# Patient Record
Sex: FEMALE | Race: WHITE | NOT HISPANIC OR LATINO | URBAN - METROPOLITAN AREA
[De-identification: names, ages, dates, MRNs, and addresses within clinical notes are randomized per-mention and may not be internally consistent; named-entity substitution may affect disease eponyms.]

---

## 2017-08-09 ENCOUNTER — CONVERSION ENCOUNTER (OUTPATIENT)
Dept: OTHER | Facility: HOSPITAL | Age: 42
End: 2017-08-09

## 2018-03-28 ENCOUNTER — OFFICE VISIT CONVERTED (OUTPATIENT)
Dept: FAMILY MEDICINE CLINIC | Age: 43
End: 2018-03-28
Attending: FAMILY MEDICINE

## 2018-08-30 ENCOUNTER — CONVERSION ENCOUNTER (OUTPATIENT)
Dept: OTHER | Facility: HOSPITAL | Age: 43
End: 2018-08-30

## 2018-10-31 ENCOUNTER — OFFICE VISIT CONVERTED (OUTPATIENT)
Dept: FAMILY MEDICINE CLINIC | Age: 43
End: 2018-10-31
Attending: FAMILY MEDICINE

## 2018-11-09 ENCOUNTER — OFFICE VISIT CONVERTED (OUTPATIENT)
Dept: FAMILY MEDICINE CLINIC | Age: 43
End: 2018-11-09
Attending: NURSE PRACTITIONER

## 2019-02-20 ENCOUNTER — OFFICE VISIT CONVERTED (OUTPATIENT)
Dept: FAMILY MEDICINE CLINIC | Age: 44
End: 2019-02-20
Attending: NURSE PRACTITIONER

## 2019-02-27 ENCOUNTER — HOSPITAL ENCOUNTER (OUTPATIENT)
Dept: OTHER | Facility: HOSPITAL | Age: 44
Discharge: HOME | End: 2019-02-27
Attending: NURSE PRACTITIONER

## 2019-02-27 ENCOUNTER — OFFICE VISIT CONVERTED (OUTPATIENT)
Dept: FAMILY MEDICINE CLINIC | Age: 44
End: 2019-02-27
Attending: NURSE PRACTITIONER

## 2019-02-27 LAB
ALBUMIN SERPL-MCNC: 4.3 G/DL (ref 3.5–5)
ALBUMIN/GLOB SERPL: 1.1 {RATIO} (ref 1.4–2.6)
ALP SERPL-CCNC: 53 U/L (ref 42–98)
ALT SERPL-CCNC: 11 U/L (ref 10–40)
ANION GAP SERPL CALC-SCNC: 17 MMOL/L (ref 8–19)
AST SERPL-CCNC: 20 U/L (ref 15–50)
BASOPHILS # BLD MANUAL: 0.01 10*3/UL (ref 0–0.2)
BASOPHILS NFR BLD MANUAL: 0.2 % (ref 0–3)
BILIRUB SERPL-MCNC: 0.54 MG/DL (ref 0.2–1.3)
BUN SERPL-MCNC: 9 MG/DL (ref 5–25)
BUN/CREAT SERPL: 12 {RATIO} (ref 6–20)
CALCIUM SERPL-MCNC: 9.6 MG/DL (ref 8.7–10.4)
CHLORIDE SERPL-SCNC: 101 MMOL/L (ref 99–111)
CONV CO2: 25 MMOL/L (ref 22–32)
CONV TOTAL PROTEIN: 8.1 G/DL (ref 6.3–8.2)
CREAT UR-MCNC: 0.75 MG/DL (ref 0.5–0.9)
D DIMER PPP FEU-MCNC: 0.4 MG/L (ref 0–0.59)
DEPRECATED RDW RBC AUTO: 44.7 FL
EOSINOPHIL # BLD MANUAL: 0.05 10*3/UL (ref 0–0.7)
EOSINOPHIL NFR BLD MANUAL: 0.9 % (ref 0–7)
ERYTHROCYTE [DISTWIDTH] IN BLOOD BY AUTOMATED COUNT: 13.2 % (ref 11.5–14.5)
GFR SERPLBLD BASED ON 1.73 SQ M-ARVRAT: >60 ML/MIN/{1.73_M2}
GLOBULIN UR ELPH-MCNC: 3.8 G/DL (ref 2–3.5)
GLUCOSE SERPL-MCNC: 84 MG/DL (ref 65–99)
GRANS (ABSOLUTE): 3.13 10*3/UL (ref 2–8)
GRANS: 58.6 % (ref 30–85)
HBA1C MFR BLD: 14.9 G/DL (ref 12–16)
HCT VFR BLD AUTO: 44.6 % (ref 37–47)
IMM GRANULOCYTES # BLD: 0.01 10*3/UL (ref 0–0.54)
IMM GRANULOCYTES NFR BLD: 0.2 % (ref 0–0.43)
LYMPHOCYTES # BLD MANUAL: 1.8 10*3/UL (ref 1–5)
LYMPHOCYTES NFR BLD MANUAL: 6.5 % (ref 3–10)
MCH RBC QN AUTO: 30.2 PG (ref 27–31)
MCHC RBC AUTO-ENTMCNC: 33.4 G/DL (ref 33–37)
MCV RBC AUTO: 90.3 FL (ref 81–99)
MONOCYTES # BLD AUTO: 0.35 10*3/UL (ref 0.2–1.2)
MONONUCLEOSIS TEST, QUAL: NEGATIVE
OSMOLALITY SERPL CALC.SUM OF ELEC: 286 MOSM/KG (ref 273–304)
PLATELET # BLD AUTO: 210 10*3/UL (ref 130–400)
PMV BLD AUTO: 10.5 FL (ref 7.4–10.4)
POTASSIUM SERPL-SCNC: 4 MMOL/L (ref 3.5–5.3)
RBC # BLD AUTO: 4.94 10*6/UL (ref 4.2–5.4)
SODIUM SERPL-SCNC: 139 MMOL/L (ref 135–147)
TSH SERPL-ACNC: 1.42 M[IU]/L (ref 0.27–4.2)
VARIANT LYMPHS NFR BLD MANUAL: 33.6 % (ref 20–45)
VIT B12 SERPL-MCNC: 217 PG/ML (ref 211–911)
WBC # BLD AUTO: 5.35 10*3/UL (ref 4.8–10.8)

## 2019-02-28 LAB — 25(OH)D3 SERPL-MCNC: 58.2 NG/ML (ref 30–100)

## 2019-05-07 ENCOUNTER — HOSPITAL ENCOUNTER (OUTPATIENT)
Dept: OTHER | Facility: HOSPITAL | Age: 44
Discharge: HOME | End: 2019-05-07
Attending: NURSE PRACTITIONER

## 2019-05-10 LAB — CONV TREPONEMA PALLIDUM (RPR) WITH FTA-ABS, TP-PA REFLEXES: NON REACTIVE

## 2019-07-11 ENCOUNTER — OFFICE VISIT CONVERTED (OUTPATIENT)
Dept: FAMILY MEDICINE CLINIC | Age: 44
End: 2019-07-11
Attending: NURSE PRACTITIONER

## 2019-09-04 ENCOUNTER — OFFICE VISIT CONVERTED (OUTPATIENT)
Dept: FAMILY MEDICINE CLINIC | Age: 44
End: 2019-09-04
Attending: NURSE PRACTITIONER

## 2019-09-05 ENCOUNTER — HOSPITAL ENCOUNTER (OUTPATIENT)
Dept: OTHER | Facility: HOSPITAL | Age: 44
Discharge: HOME | End: 2019-09-05
Attending: SPECIALIST

## 2020-03-27 ENCOUNTER — OFFICE VISIT CONVERTED (OUTPATIENT)
Dept: FAMILY MEDICINE CLINIC | Age: 45
End: 2020-03-27
Attending: NURSE PRACTITIONER

## 2020-10-16 ENCOUNTER — HOSPITAL ENCOUNTER (OUTPATIENT)
Dept: OTHER | Facility: HOSPITAL | Age: 45
Discharge: HOME | End: 2020-10-16
Attending: SPECIALIST

## 2020-10-26 ENCOUNTER — HOSPITAL ENCOUNTER (OUTPATIENT)
Dept: PREADMISSION TESTING | Facility: HOSPITAL | Age: 45
Discharge: HOME | End: 2020-10-26
Attending: SPECIALIST

## 2020-10-29 LAB — SARS-COV-2 RNA SPEC QL NAA+PROBE: NOT DETECTED

## 2021-04-23 ENCOUNTER — OFFICE VISIT CONVERTED (OUTPATIENT)
Dept: FAMILY MEDICINE CLINIC | Age: 46
End: 2021-04-23
Attending: NURSE PRACTITIONER

## 2021-04-30 ENCOUNTER — OFFICE VISIT CONVERTED (OUTPATIENT)
Dept: FAMILY MEDICINE CLINIC | Age: 46
End: 2021-04-30
Attending: NURSE PRACTITIONER

## 2021-04-30 ENCOUNTER — HOSPITAL ENCOUNTER (OUTPATIENT)
Dept: OTHER | Facility: HOSPITAL | Age: 46
Discharge: HOME | End: 2021-04-30
Attending: NURSE PRACTITIONER

## 2021-04-30 LAB
ALBUMIN SERPL-MCNC: 4.6 G/DL (ref 3.5–5)
ALBUMIN/GLOB SERPL: 1.4 {RATIO} (ref 1.4–2.6)
ALP SERPL-CCNC: 106 U/L (ref 42–98)
ALT SERPL-CCNC: 19 U/L (ref 10–40)
ANION GAP SERPL CALC-SCNC: 15 MMOL/L (ref 8–19)
AST SERPL-CCNC: 26 U/L (ref 15–50)
BASOPHILS # BLD MANUAL: 0.02 10*3/UL (ref 0–0.2)
BASOPHILS NFR BLD MANUAL: 0.3 % (ref 0–3)
BILIRUB SERPL-MCNC: 0.64 MG/DL (ref 0.2–1.3)
BUN SERPL-MCNC: 10 MG/DL (ref 5–25)
BUN/CREAT SERPL: 13 {RATIO} (ref 6–20)
CALCIUM SERPL-MCNC: 10.2 MG/DL (ref 8.7–10.4)
CHLORIDE SERPL-SCNC: 104 MMOL/L (ref 99–111)
CHOLEST SERPL-MCNC: 212 MG/DL (ref 107–200)
CHOLEST/HDLC SERPL: 3.4 {RATIO} (ref 3–6)
CONV CO2: 27 MMOL/L (ref 22–32)
CONV TOTAL PROTEIN: 7.9 G/DL (ref 6.3–8.2)
CREAT UR-MCNC: 0.76 MG/DL (ref 0.5–0.9)
DEPRECATED RDW RBC AUTO: 43.2 FL
EOSINOPHIL # BLD MANUAL: 0.16 10*3/UL (ref 0–0.7)
EOSINOPHIL NFR BLD MANUAL: 2.8 % (ref 0–7)
ERYTHROCYTE [DISTWIDTH] IN BLOOD BY AUTOMATED COUNT: 13.2 % (ref 11.5–14.5)
GFR SERPLBLD BASED ON 1.73 SQ M-ARVRAT: >60 ML/MIN/{1.73_M2}
GLOBULIN UR ELPH-MCNC: 3.3 G/DL (ref 2–3.5)
GLUCOSE SERPL-MCNC: 85 MG/DL (ref 65–99)
GRANS (ABSOLUTE): 3.19 10*3/UL (ref 2–8)
GRANS: 55 % (ref 30–85)
HBA1C MFR BLD: 14.7 G/DL (ref 12–16)
HCT VFR BLD AUTO: 44.9 % (ref 37–47)
HDLC SERPL-MCNC: 62 MG/DL (ref 40–60)
IMM GRANULOCYTES # BLD: 0 10*3/UL (ref 0–0.54)
IMM GRANULOCYTES NFR BLD: 0 % (ref 0–0.43)
LDLC SERPL CALC-MCNC: 134 MG/DL (ref 70–100)
LYMPHOCYTES # BLD MANUAL: 2 10*3/UL (ref 1–5)
LYMPHOCYTES NFR BLD MANUAL: 7.4 % (ref 3–10)
MCH RBC QN AUTO: 28.9 PG (ref 27–31)
MCHC RBC AUTO-ENTMCNC: 32.7 G/DL (ref 33–37)
MCV RBC AUTO: 88.2 FL (ref 81–99)
MONOCYTES # BLD AUTO: 0.43 10*3/UL (ref 0.2–1.2)
OSMOLALITY SERPL CALC.SUM OF ELEC: 292 MOSM/KG (ref 273–304)
PLATELET # BLD AUTO: 199 10*3/UL (ref 130–400)
PMV BLD AUTO: 10.5 FL (ref 7.4–10.4)
POTASSIUM SERPL-SCNC: 4.2 MMOL/L (ref 3.5–5.3)
RBC # BLD AUTO: 5.09 10*6/UL (ref 4.2–5.4)
SODIUM SERPL-SCNC: 142 MMOL/L (ref 135–147)
TRIGL SERPL-MCNC: 78 MG/DL (ref 40–150)
TSH SERPL-ACNC: 1.01 M[IU]/L (ref 0.27–4.2)
VARIANT LYMPHS NFR BLD MANUAL: 34.5 % (ref 20–45)
VLDLC SERPL-MCNC: 16 MG/DL (ref 5–37)
WBC # BLD AUTO: 5.8 10*3/UL (ref 4.8–10.8)

## 2021-05-18 NOTE — PROGRESS NOTES
Ham Gonzalez AMNA  1975     Office/Outpatient Visit    Visit Date:  10:49 am    Provider: Mariah Gonzalez N.P. (Assistant: Namrata Gonzalez MA)    Location: [facility]        Electronically signed by Mariah Gonzalez N.P. on  2021 08:27:48 PM                             Subjective:        CC: Mrs. Gonzalez is a 45 year old White female.  This is a follow-up visit.  physical exam         HPI:           Mrs. Gonzalez presents with encounter for general adult medical examination without abnormal findings.  Her last mammogram was in 10/16/2020 and was normal.   Preventative Health updated today.  She is not current with her Td and influenza immunization.      ROS:     CONSTITUTIONAL:  Negative for chills and fever.      EYES:  Negative for eye drainage and eye pain.      E/N/T:  Negative for ear pain and sore throat.      CARDIOVASCULAR:  Negative for chest pain and palpitations.      RESPIRATORY:  Negative for dyspnea and frequent wheezing.      GASTROINTESTINAL:  Negative for abdominal pain and vomiting.      MUSCULOSKELETAL:  Positive for left hand pain - improvement with wearing brace and course of steroids.      INTEGUMENTARY/BREAST:  Negative for rash.      PSYCHIATRIC:  Negative for depression and suicidal thoughts.          Past Medical History / Family History / Social History:         Last Reviewed on 2021 02:22 PM by Mariah Gonzalez    Past Medical History: chronic insomnia    h/o anxiety/depression    allergies    GERD    chronic rash     miscarriage    endometriosis                 PAST MEDICAL HISTORY             GYNECOLOGICAL HISTORY:     miscarriage 2         PREVENTIVE HEALTH MAINTENANCE             COLORECTAL CANCER SCREENING: declines colorectal cancer screening, understands reason for testing     MAMMOGRAM: Done within last 2 years and results in are chart was last done  with normal results Dr. Newsome     PAP SMEAR: was last done  with normal results  Dr Newsome         Surgical History:         Hysterectomy: 10/30/2020;     EGD 2013;    abdominal laproscopies for endometriosis;    D& C X 2 for miscarriages;         Family History:         Positive for Myocardial Infarction ( mother ).      Positive for Breast Cancer ( mat. GM ) and Ovarian Cancer ( mat. GM ).          Social History:     Occupation: [employer]     Marital Status:  (x 1)     Children: 3 children         Tobacco/Alcohol/Supplements:     Last Reviewed on 4/30/2021 10:58 AM by Namrata Gonzalez    Tobacco: She has never smoked.          Alcohol: Frequency: Socially     Caffeine:  She admits to consuming caffeine via tea ( -every now and then ) and soda ( couple servings per week ).          Substance Abuse History:     Last Reviewed on 11/16/2016 04:49 PM by Nathalia Gregory        Mental Health History:     Last Reviewed on 11/16/2016 04:49 PM by Nathalia Gregory        Communicable Diseases (eg STDs):     Last Reviewed on 11/16/2016 04:49 PM by Nathalia Gregory        Current Problems:     Last Reviewed on 11/16/2016 04:49 PM by Nathalia Gregory    Allergies    Gastro-esophageal reflux disease without esophagitis    Atopic dermatitis, unspecified    Other dorsalgia    Primary generalized (osteo)arthritis    Endometriosis, unspecified    Decreased white blood cell count, unspecified    Other fatigue    Other mixed anxiety disorders    Screening for depression    Encounter for screening for depression    Pain in joints of left hand    Encounter for general adult medical examination without abnormal findings        Immunizations:     Fluzone Quadrivalent (3+ years) 10/1/2018    Influenza Virus Vaccine, unspecified formulation 10/1/2017        Allergies:     Last Reviewed on 4/30/2021 10:57 AM by Namrata Gonzalez    Penicillins:      Sulfonamides:      Morphine Sulfate:          Current Medications:     Last Reviewed on 4/30/2021 10:57 AM by Namrata oGnzalez     Multivitamin/Mineral Supplement     cetirizine 10 mg oral tablet [1 tab daily]    folic acid OTC daily         Objective:        Vitals:         Current: 4/30/2021 11:00:40 AM    Ht:  5 ft, 5 in;  Wt: 158.2 lbs;  BMI: 26.3T: 95.9 F (temporal);  BP: 128/85 mm Hg (right arm, sitting);  P: 73 bpm (right arm (BP Cuff), sitting);  sCr: 0.75 mg/dL;  GFR: 95.88        Exams:     PHYSICAL EXAM:     GENERAL: well developed, well nourished;  no apparent distress;     EYES: PERRL, EOMI     E/N/T: EARS: external auditory canal normal;  bilateral TMs are normal;  NOSE: normal turbinates; no sinus tenderness; OROPHARYNX: oral mucosa is normal; posterior pharynx shows no exudate;     RESPIRATORY: normal respiratory rate and pattern with no distress; normal breath sounds with no rales, rhonchi, wheezes or rubs;     CARDIOVASCULAR: normal rate; rhythm is regular;     GASTROINTESTINAL: nontender; normal bowel sounds; no organomegaly;     BREAST/INTEGUMENT: SKIN: no significant rashes or lesions; no suspicious moles;     MUSCULOSKELETAL: normal gait; normal range of motion of all major muscle groups;     NEUROLOGIC: mental status: alert and oriented x 3; GROSSLY INTACT     PSYCHIATRIC: appropriate affect and demeanor;         Assessment:         Z00.00   Encounter for general adult medical examination without abnormal findings       M25.542   Pain in joints of left hand           ORDERS:         Meds Prescribed:       [New Rx] diclofenac sodium 75 mg oral tablet, delayed release (enteric coated) [take 1 tablet (75 mg) by oral route every 12 hours as needed], #60 (sixty) tablets, Refills: 0 (zero)         Lab Orders:       [id number]  [facility] PHYSICAL: CMP, CBC, TSH, LIPID: [id number], [id number], [id number], [id number]  (Send-Out)            APPTO  Appointment need  (In-House)              Procedures Ordered:       RFPT  Physical/Occupational Therapy Referral  (Send-Out)              Other Orders:         Screening mammogram results documented   (Send-Out)                      Plan:         Encounter for general adult medical examination without abnormal findingsUTD mammogram and pap smear with GYN. Declines influenza, Tdap vaccine.    LABORATORY:  Labs ordered to be performed today include PHYSICAL PANEL; CMP, CBC, TSH, LIPID.  MIPS Vaccines Flu and Pneumonia updated in Shot record Screening mammomgram done within last 2 years and results in are chart     FOLLOW-UP: Schedule a follow-up visit in 12 months.:return for flu shot:for Annual Checkup     COUNSELING was provided today regarding the following topics: healthy eating habits, regular exercise, breast self-exam, and Advised to see dentist regularly.            Orders:       [id number]  [facility] PHYSICAL: CMP, CBC, TSH, LIPID: [id number], [id number], [id number], [id number]  (Send-Out)              Screening mammogram results documented  (Send-Out)            APPTO  Appointment need  (In-House)              Pain in joints of left hand        REFERRALS:  Referral initiated to physical therapy ( [facility] ) for evaluation and treatment.            Prescriptions:       [New Rx] diclofenac sodium 75 mg oral tablet, delayed release (enteric coated) [take 1 tablet (75 mg) by oral route every 12 hours as needed], #60 (sixty) tablets, Refills: 0 (zero)           Orders:       RFPT  Physical/Occupational Therapy Referral  (Send-Out)                  Patient Recommendations:        For  Encounter for general adult medical examination without abnormal findings:        Limit dietary intake of fat (especially saturated fat) and cholesterol.  Eat a variety of foods, including plenty of fruits, vegetables, and grain containg fiber, limit fat intake to 30% of total calories. Balance caloric intake with energy expended.    Maintaining regular physical activity is advised to help prevent heart disease, hypertension, diabetes, and obesity.    You should regularly examine your breasts, easily done while in the shower or with lotion.  Feel and  look for differences in consistency from month to month, especially noting knots or lumps, changes in skin appearance, nipple retraction or discharge.  Schedule a follow-up visit in 12 months.              Charge Capture:         Primary Diagnosis:     Z00.00  Encounter for general adult medical examination without abnormal findings           Orders:      [id number]  Preventive medicine, established patient, age 40-64 years  (In-House)            APPTO  Appointment need  (In-House)              M25.542  Pain in joints of left hand

## 2021-05-18 NOTE — PROGRESS NOTES
Ham Gonzalez MASSIEL 1975     Office/Outpatient Visit    Visit Date:  02:19 pm    Provider: Mariah Gonzalez N.P. (Assistant: Jaky Vasquez MA)    Location: [facility]        Electronically signed by Mariah Gonzalez N.P. on  2018 03:47:20 PM                             SUBJECTIVE:        CC:     Mrs. Gonzalez is a 43 year old White female.  presents today due to complaints of possible STD         HPI: Ham is here today as her boyfriend tested positive for syphilis. Dr. White contacted him and he is coming in today for PCN shot. Ham is allergic to PCN. She was recently seen by Dr. Barrett, her PCP, for body aches and fatigue with lab work performed. She notes that her neck pain has improved.                 ROS:     CONSTITUTIONAL:  Negative for chills and fever.      CARDIOVASCULAR:  Negative for chest pain and palpitations.      RESPIRATORY:  Negative for dyspnea and frequent wheezing.      GASTROINTESTINAL:  Negative for abdominal pain and vomiting.      GENITOURINARY:  Positive for vaginal discharge.   Negative for frequent urination.      MUSCULOSKELETAL:  Positive for neck pain - improved.      INTEGUMENTARY/BREAST:  Negative for rash.          PMH/FMH/SH:     Last Reviewed on 10/31/2018 02:54 PM by Madeline Barrett    Past Medical History: chronic insomnia    h/o anxiety/depression    allergies    GERD    chronic rash     miscarriage    endometriosis                 PAST MEDICAL HISTORY             GYNECOLOGICAL HISTORY:     miscarriage 2         Surgical History:         EGD ;    abdominal laproscopies for endometriosis, DNC misscarriage X 3;         Family History:         Positive for Myocardial Infarction ( mother ).      Positive for Breast Cancer ( mat. GM ) and Ovarian Cancer ( mat. GM ).          Social History:     Occupation: [employer];     Marital Status:  (x 1)     Children: 3 children         Tobacco/Alcohol/Supplements:     Last Reviewed  on 10/31/2018 02:54 PM by Madeline Barrett    Tobacco: She has never smoked.          Alcohol: Frequency: Socially         Substance Abuse History:     Last Reviewed on 11/16/2016 04:49 PM by Nathalia Gregory        Mental Health History:     Last Reviewed on 11/16/2016 04:49 PM by Nathalia Gregory        Communicable Diseases (eg STDs):     Last Reviewed on 11/16/2016 04:49 PM by Nathalia Gregory            Current Problems:     Last Reviewed on 11/16/2016 04:49 PM by Nathalia Gregory    Leukocytopenia, unspecified     Myalgia     Endometriosis, site unspecified     Diffuse arthralgia     Atopic dermatitis, other     GERD     Allergies     Chronic insomnia     Generalized anxiety disorder     Neck strain     Flank pain         Immunizations:     Influenza Virus Vaccine, unspecified formulation 10/1/2017         Allergies:     Last Reviewed on 10/31/2018 02:11 PM by Spurling, Sarah C    Penicillins:    Sulfonamides:    Morphine Sulfate:        Current Medications:     Last Reviewed on 10/31/2018 02:19 PM by Spurling, Sarah C    Lo Loestrin Fe Biphasic Tablet Take 1 tablet(s) by mouth daily as directed.     Diflucan 150mg Tablet 1 po daily     Ibuprofen 800mg Tablet Take 1 tablet(s) by mouth q8h prn     Tizanidine HCl 2mg Tablet Take 1-2 tabs bid prn muscle pain     Folic Acid 5mg/1ml Injection takes one PO a day     Cetirizine HCl 10mg Tablet 1 tab daily     Multivitamin/Mineral Supplement         OBJECTIVE:        Vitals:         Current: 11/9/2018 2:25:19 PM    Ht:  5 ft, 5 in;  Wt: 140.4 lbs;  BMI: 23.4    T: 97.7 F (oral);  BP: 122/65 mm Hg (right arm, sitting);  P: 78 bpm (right arm (BP Cuff), sitting);  sCr: 0.82 mg/dL;  GFR: 85.09        Exams:     PHYSICAL EXAM:     GENERAL: well developed, well nourished;  no apparent distress;     RESPIRATORY: normal respiratory rate and pattern with no distress; normal breath sounds with no rales, rhonchi, wheezes or rubs;     CARDIOVASCULAR: normal rate; rhythm is  regular;     MUSCULOSKELETAL: normal gait;     NEUROLOGIC: mental status: alert and oriented x 3; GROSSLY INTACT     PSYCHIATRIC: appropriate affect and demeanor;         ASSESSMENT           097.9   A53.9  Syphilis, unspecified              DDx:     112.9   B37.9  Yeast infection              DDx:         ORDERS:         Meds Prescribed:       Doxycycline Monohydrate 100mg Capsules Take 1 capsule(s) by mouth bid x10 days  #20 (Twenty) capsule(s) Refills: 0       Diflucan (Fluconazole) 150mg Tablet Take 1 tablet(s) by mouth once - may repeat in 1 week if needed  #2 (Two) tablet(s) Refills: 0         Lab Orders:       APPTO  Appointment need  (In-House)                   PLAN:          Syphilis, unspecified Pt declined additional STD testing at time of visit. Advised to follow up to have done as needed. She should have test of cure in 6 months following treatment. No sexual intercourse for one week following treatment. Condoms for STD prevention.         FOLLOW-UP: Schedule follow-up appointments on a p.r.n. basis. Schedule a follow-up visit in 6 months. for test of cure Chronic visit follow up           Prescriptions:       Doxycycline Monohydrate 100mg Capsules Take 1 capsule(s) by mouth bid x10 days  #20 (Twenty) capsule(s) Refills: 0           Orders:       APPTO  Appointment need  (In-House)             Patient Education Handouts:       Syphilis           Yeast infection           Prescriptions:       Diflucan (Fluconazole) 150mg Tablet Take 1 tablet(s) by mouth once - may repeat in 1 week if needed  #2 (Two) tablet(s) Refills: 0             Patient Recommendations:        For  Syphilis, unspecified:     Schedule follow-up appointments as needed. Schedule a follow-up visit in 6 months.              CHARGE CAPTURE           **Please note: ICD descriptions below are intended for billing purposes only and may not represent clinical diagnoses**        Primary Diagnosis:         097.9 Syphilis, unspecified             A53.9    Syphilis, unspecified              Orders:          [id number]   Office/outpatient visit; established patient, level 3  (In-House)             APPTO   Appointment need  (In-House)           112.9 Yeast infection            B37.9    Candidiasis, unspecified

## 2021-05-18 NOTE — PROGRESS NOTES
Ham Gonzalez 1975     Office/Outpatient Visit    Visit Date: Wed, Oct 31, 2018 02:10 pm    Provider: Madeline Barrett MD (Assistant: Sarah Spurling, MA)    Location: [facility]        Electronically signed by Madeline Barrett MD on  2018 04:10:42 PM                             SUBJECTIVE:        CC: flu like symptoms            HPI:     acute onset  of all over body aches, she states she is really fatigued, and she has noted a tender area on her L shoulder that is tight, and she is using icy hot and ibuprofen/tylenol without any improvement the neck and shoulder pain started a month ago.  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     ROS:     CONSTITUTIONAL:  Positive for fatigue.   Negative for chills or fever.      EYES:  Negative for blurred vision, eye pain, and photophobia.      E/N/T:  Positive for frequent rhinorrhea.   Negative for ear pain, nasal congestion or sore throat.      CARDIOVASCULAR:  Negative for chest pain, dizziness, palpitations and tachycardia.      RESPIRATORY:  Positive for recent cough ( mild ).   Negative for dyspnea or frequent wheezing.      GASTROINTESTINAL:  Positive for nausea.   Negative for abdominal pain, constipation, diarrhea, hematochezia, melena or vomiting.      GENITOURINARY:  Negative for dysuria, nocturia, polyuria and urinary incontinence.      MUSCULOSKELETAL:  Positive for arthralgias and myalgias.      INTEGUMENTARY/BREAST:  Negative for rash.      NEUROLOGICAL:  Positive for headaches ( chronic ).   Negative for dizziness.          PMH/FMH/SH:     Last Reviewed on 10/31/2018 02:54 PM by Madeline Barrett    Past Medical History: chronic insomnia    h/o anxiety/depression    allergies    GERD    chronic rash     miscarriage    endometriosis                 PAST MEDICAL HISTORY             GYNECOLOGICAL HISTORY:     miscarriage 2         Surgical History:         EGD  2013;    abdominal laproscopies for endometriosis, DNC misscarriage X 3;         Family History:         Positive for Myocardial Infarction ( mother ).      Positive for Breast Cancer ( mat. GM ) and Ovarian Cancer ( mat. GM ).          Social History:     Occupation: [employer];     Marital Status:  (x 1)     Children: 3 children         Tobacco/Alcohol/Supplements:     Last Reviewed on 10/31/2018 02:54 PM by Madeline Barrett    Tobacco: She has never smoked.          Alcohol: Frequency: Socially         Substance Abuse History:     Last Reviewed on 11/16/2016 04:49 PM by Nathalia Gregory        Mental Health History:     Last Reviewed on 11/16/2016 04:49 PM by Nathalia Gregory        Communicable Diseases (eg STDs):     Last Reviewed on 11/16/2016 04:49 PM by Nathalia Gregory            Allergies:     Last Reviewed on 3/28/2018 02:55 PM by Roxy Yepez    Penicillins:    Sulfonamides:    Morphine Sulfate:        Current Medications:     Last Reviewed on 3/28/2018 02:55 PM by Roxy Yepez    Lo Loestrin Fe Biphasic Tablet Take 1 tablet(s) by mouth daily as directed.     Cetirizine HCl 10mg Tablet 1 tab daily     Multivitamin/Mineral Supplement         OBJECTIVE:        Vitals:         Current: 10/31/2018 2:21:01 PM    Ht:  5 ft, 5 in;  Wt: 142.6 lbs;  BMI: 23.7    T: 98.7 F;  BP: 126/84 mm Hg (right arm, sitting);  P: 103 bpm (right arm (BP Cuff), sitting);  sCr: 0.86 mg/dL;  GFR: 81.67        Exams:     PHYSICAL EXAM:     GENERAL: well groomed;  no apparent distress;     EYES: PERRL, EOMI     E/N/T: EARS:  normal external auditory canals and tympanic membranes;  grossly normal hearing; NOSE:  normal nasal mucosa, septum, turbinates, and sinuses; OROPHARYNX:  normal mucosa, dentition, gingiva, and posterior pharynx;     NECK:  supple, full ROM; no thyromegaly; no carotid bruits;     RESPIRATORY: normal appearance and symmetric expansion of chest wall; normal respiratory rate and  pattern with no distress; coarse breath sounds in the apices;     CARDIOVASCULAR: normal rate; rhythm is regular;  no systolic murmur; no cyanosis; no cyanosis; no edema;     GASTROINTESTINAL: nontender, nondistended; no hepatosplenomegaly or masses; no bruits;     BREAST/INTEGUMENT: SKIN: no significant rashes or lesions; no suspicious moles;     MUSCULOSKELETAL: normal gait; L neck/shoulder-tender to palpation over rhomboid and trapezius muscles with notable muscle spasm, FROM of neck and L shoulder, 5/5 MS strength UE B, decrease sensation over distal L forearm to soft touch, 2/4 brachioradialis DTR B;     NEUROLOGIC: GROSSLY INTACT         Lab/Test Results:             Influenza A and B:  Negative (10/31/2018),     Performed by::  pr (10/31/2018),     Mono:  Negative (10/31/2018),             ASSESSMENT           729.1   M79.1  Myalgia              DDx:     847.0   S16.1XXA  Neck strain              DDx:     288.50   D72.819  Leukocytopenia, unspecified              DDx:         ORDERS:         Meds Prescribed:       Ibuprofen 800mg Tablet Take 1 tablet(s) by mouth q8h prn  #30 (Thirty) tablet(s) Refills: 0       Tizanidine HCl 2mg Tablet Take 1-2 tabs bid prn muscle pain  #40 (Forty) tablet(s) Refills: 0         Lab Orders:       [id number]  Infectious agent antigen detection by immunoassay; Influenza  (In-House)         15311  Infectious agent antigen detection by immunoassay; Influenza  (In-House)         21500  [facility] Basic Metabolic Panel  (Send-Out)         54443  SED [facility] Sedimentation rate, non-automated ESR  (Send-Out)         88314  [facility] Ur[facility], automated, with micro  (Send-Out; Stat)         12017  [facility] [place] [address] Hepatitis Panel (HAAb, HbcAB, HbsAG, Hep C antibody)  (Send-Out)         03785  HIV [facility] HIV-1 and HIV-2 Ab   (Send-Out)         83933  Infectious mononucleosis screen  (In-House)         46427  Herpes virus-6 detection by nucleic acid, amplified probe.  (Send-Out)          FUTURE  Future order to be done at patients convenience  (Send-Out)         79652  [facility] CBC with 3 part diff  (Send-Out)  (STAT)         Procedures Ordered:       [id number]  Collection of capillary blood specimen (eg, finger, heel, ear stick)  (In-House)                   PLAN:          Myalgia    LABORATORY:  Labs ordered to be performed today include basic metabolic panel, ESR, hepatitis panel, HIV, and urinalysis with micro.      FOLLOW-UP TESTING #1: FOLLOW-UP LABORATORY:  Labs to be scheduled in the future include CBC.            Orders:       [id number]  Infectious agent antigen detection by immunoassay; Influenza  (In-House)         80396  Infectious agent antigen detection by immunoassay; Influenza  (In-House)         33875  B[facility] - [facility] Basic Metabolic Panel  (Send-Out)         39895  [facility] Sedimentation rate, non-automated ESR  (Send-Out)         03801  [facility] Urinalysis, automated, with micro  (Send-Out; Stat)         85957  [address] [place] [address] Hepatitis Panel (HAAb, HbcAB, HbsAG, Hep C antibody)  (Send-Out)         91153  HIV [facility] HIV-1 and HIV-2 Ab   (Send-Out)         FUTURE  Future order to be done at patients convenience  (Send-Out)         78004  [facility] CBC with 3 part diff  (Send-Out)  (STAT)          Neck strain due to overuse at her job, will start NSAID, muscle relaxant, heat, rest, recommend PT           Prescriptions:       Ibuprofen 800mg Tablet Take 1 tablet(s) by mouth q8h prn  #30 (Thirty) tablet(s) Refills: 0       Tizanidine HCl 2mg Tablet Take 1-2 tabs bid prn muscle pain  #40 (Forty) tablet(s) Refills: 0          Leukocytopenia, unspecified           Orders:       [id number]  Infectious mononucleosis screen  (In-House)         01093  Herpes virus-6 detection by nucleic acid, amplified probe.  (Send-Out)         87246  Collection of capillary blood specimen (eg, finger, heel, ear stick)  (In-House)               Patient Recommendations:        Myalgia            The following  laboratory testing has been ordered: CBC             CHARGE CAPTURE           **Please note: ICD descriptions below are intended for billing purposes only and may not represent clinical diagnoses**        Primary Diagnosis:         729.1 Myalgia            M79.1    Myalgia              Orders:          12427   Office/outpatient visit; established patient, level 4  (In-House)             63230 -59  Infectious agent antigen detection by immunoassay; Influenza  (In-House)             77360   Infectious agent antigen detection by immunoassay; Influenza  (In-House)           847.0 Neck strain            S16.1XXA    Strain of muscle, fascia and tendon at neck level, initial encounter    288.50 Leukocytopenia, unspecified            D72.819    Decreased white blood cell count, unspecified              Orders:          27964   Infectious mononucleosis screen  (In-House)             68268   Collection of capillary blood specimen (eg, finger, heel, ear stick)  (In-House)

## 2021-05-18 NOTE — PROGRESS NOTES
"Ham Gonzalez. 1975     Office/Outpatient Visit    Visit Date: Wed, Mar 28, 2018 02:51 pm    Provider: Madeline Barrett MD (Assistant: Roxy Yepez MA)    Location: [facility]        Electronically signed by Madeline Barrett MD on  03/29/2018 03:12:30 PM                             SUBJECTIVE:        CC:     Mrs. Gonzalez is a 42 year old White female.  She is here today following a transition of care from the emergency department ( [facility] on 3-25-18, dx with flu b and bronchitis, pt states she is no better ).  pt states she was prescribed phenergan and tamiflu, but all pharmacies are out of tamiflu;         HPI:     Nahed was seen in ER this past Sunday for URI, she was diagnosed with the flu B.  Her symptons onset Saturday with sore throat and abd cramping, and by Sunday she had diarrhea, body aches, vomiting, fever, dry deep cough and sinus congestion.  Her fever broke yesterday.  She was sent home on tamiful and phenergan, she couldnt get tamiflu-pharmacies she went to were \"out\".  Today she is 5 days out post illness onset.  She still feels bad with all above symptoms except fever. She had a CXR that was normal but the ER physician told her that on PE she was suspicious for pneumonia.  Strept test was normal.     Nahed has endometriosis and sees Dr Brock for this     ROS:     CONSTITUTIONAL:  Negative for chills and fever.      EYES:  Negative for blurred vision, eye pain, and photophobia.      CARDIOVASCULAR:  Negative for chest pain, dizziness, palpitations and tachycardia.      RESPIRATORY:  Positive for recent cough and dyspnea.   Negative for frequent wheezing.      GASTROINTESTINAL:  Negative for abdominal pain, heartburn, constipation, diarrhea, and stool changes.      NEUROLOGICAL:  Positive for paresthesias and weakness.   Negative for dizziness or headaches.      PSYCHIATRIC:  Negative for anxiety, depression, and sleep disturbances.          PMH/FMH/SH:     Last Reviewed on " 3/28/2018 03:43 PM by Madeline Barrett    Past Medical History: chronic insomnia    h/o anxiety/depression    allergies    GERD    chronic rash     miscarriage    endometriosis                 PAST MEDICAL HISTORY             GYNECOLOGICAL HISTORY:     miscarriage 2         Surgical History:         EGD ;    abdominal laproscopies for endometriosis, DNC misscarriage X 3;         Family History:         Positive for Myocardial Infarction ( mother ).      Positive for Breast Cancer ( mat. GM ) and Ovarian Cancer ( mat. GM ).          Social History:     Occupation: [employer];     Marital Status:  (x 1)     Children: 3 children         Tobacco/Alcohol/Supplements:     Last Reviewed on 3/28/2018 03:43 PM by Madeline Barrett    Tobacco: She has never smoked.          Alcohol: Frequency: Socially         Substance Abuse History:     Last Reviewed on 2016 04:49 PM by Nathalia Gregory        Mental Health History:     Last Reviewed on 2016 04:49 PM by Nathalia Gregory        Communicable Diseases (eg STDs):     Last Reviewed on 2016 04:49 PM by Nathalia Gregory            Allergies:     Last Reviewed on 3/28/2018 02:55 PM by Roxy Yepez    Penicillins:    Sulfonamides:    Morphine Sulfate:        Current Medications:     Last Reviewed on 3/28/2018 02:55 PM by Rxoy Yepez    Lo Loestrin Fe Biphasic Tablet Take 1 tablet(s) by mouth daily as directed.     Cetirizine HCl 10mg Tablet 1 tab daily     Multivitamin/Mineral Supplement         OBJECTIVE:        Vitals:         Current: 3/28/2018 2:54:47 PM    Ht:  5 ft, 5 in;  Wt: 136.1 lbs;  BMI: 22.6    T: 97.2 F (oral);  BP: 117/82 mm Hg (left arm, sitting);  P: 85 bpm (left arm (BP Cuff), sitting);  sCr: 0.86 mg/dL;  GFR: 80.88        Exams:     PHYSICAL EXAM:     GENERAL: well groomed;  no apparent distress;     EYES: PERRL, EOMI     E/N/T: EARS:  normal external auditory canals and tympanic membranes;  grossly  normal hearing; NOSE: nasal mucosa is edematous and erythematous;  turbinates are mildly swollen bilaterally;  bilateral maxillary sinus tenderness present; OROPHARYNX:  normal mucosa, dentition, gingiva, and posterior pharynx;     NECK:  supple, full ROM; no thyromegaly; no carotid bruits;     RESPIRATORY: normal appearance and symmetric expansion of chest wall; normal respiratory rate and pattern with no distress; coarse breath sounds in the apices;     CARDIOVASCULAR: normal rate; rhythm is regular;  no systolic murmur; no cyanosis; no cyanosis; no edema;     GASTROINTESTINAL: nontender, nondistended; no hepatosplenomegaly or masses; no bruits;     MUSCULOSKELETAL:  Normal range of motion, strength and tone;     NEUROLOGIC: GROSSLY INTACT         ASSESSMENT           487.1   J10.1  Flu              DDx:     466.0   J20.9  Acute bronchitis              DDx:     617.9   N80.9  Endometriosis, site unspecified              DDx:         ORDERS:         Meds Prescribed:       Azithromycin 250mg Tablet 2 po today, 1 po x 4 days  #6 (Six) tablet(s) Refills: 0                 PLAN:          Flu call if worsening symptoms         RECOMMENDATIONS given include: Push Fluids, Rest, Follow up if no improvement or worsening symptoms like high fevers, vomiting, weakness, or increasing shortness of air.    .           Acute bronchitis will treat with abx she has tesselon pearls at home for cough     School/Work Excuse for off work Thursday and Friday           Prescriptions:       Azithromycin 250mg Tablet 2 po today, 1 po x 4 days  #6 (Six) tablet(s) Refills: 0          Endometriosis, site unspecified f/u Dr Brock             CHARGE CAPTURE           **Please note: ICD descriptions below are intended for billing purposes only and may not represent clinical diagnoses**        Primary Diagnosis:         487.1 Flu            J10.1    Influenza due to other identified influenza virus with other respiratory manifestations               Orders:          10547   Office/outpatient visit; established patient, level 3  (In-House)           466.0 Acute bronchitis            J20.9    Acute bronchitis, unspecified    617.9 Endometriosis, site unspecified            N80.9    Endometriosis, unspecified        ADDENDUMS:      ____________________________________    Addendum: 04/06/2018 03:31 PM - Jessica Perez        Please add code [id number] Passport PAF form (In-house)

## 2021-05-18 NOTE — PROGRESS NOTES
Lisa Ham MASSIEL 1975     Office/Outpatient Visit    Visit Date:  11:29 am    Provider: Mariah Gonzalez N.P. (Assistant: Jaky Vasquez MA)    Location: [facility]        Electronically signed by Mariah Gonzalez N.P. on  2019 01:16:05 PM                             SUBJECTIVE:        CC:     Mrs. Gonzalez is a 43 year old White female.  Patient presents today for follow up on depression and anxiety;         HPI:         Additionally, she presents with history of anxiety with depression.  currently not on any antidepressants.  Current affective symptoms include anxious mood, crying spells, fatigue and sadness.  Presently, Mrs. Gonzalez admits to fleeting thoughts of suicide ( she denies a suicide plan and is able to contract with me ).  Symptoms have been worsening. She has taken antidepressants in the past and feels that she needs to get back on them.  She is not interested in doing counseling at this time as she does not feel that her schedule will allow.         PHQ-9 Depression Screening: Completed form scanned and in chart; Total Score 11 Alcohol Consumption Screening: Completed form scanned and in chart; Total Score 1     ROS:     CONSTITUTIONAL:  Negative for chills and fever.      CARDIOVASCULAR:  Negative for chest pain and palpitations.      RESPIRATORY:  Negative for dyspnea and frequent wheezing.      GASTROINTESTINAL:  Negative for abdominal pain and vomiting.      NEUROLOGICAL:  Negative for dizziness and headaches.      PSYCHIATRIC:  Positive for anxiety, crying spells, depression, sadness and suicidal thoughts ( fleeting thoughts, no plan ).          PMH/FMH/SH:     Last Reviewed on 2019 11:39 AM by Mariah Gonzalez    Past Medical History: chronic insomnia    h/o anxiety/depression    allergies    GERD    chronic rash     miscarriage    endometriosis                 PAST MEDICAL HISTORY             GYNECOLOGICAL HISTORY:     miscarriage 2         PREVENTIVE HEALTH  MAINTENANCE             MAMMOGRAM: Done within last 2 years and results in are chart was last done 8/30/18 with normal results Dr. Newsome     PAP SMEAR: was last done 3/2019 with normal results         Surgical History:         EGD 2013;    abdominal laproscopies for endometriosis;    D& C X 2 for miscarriages;         Family History:         Positive for Myocardial Infarction ( mother ).      Positive for Breast Cancer ( mat. GM ) and Ovarian Cancer ( mat. GM ).          Social History:     Occupation: [employer];     Marital Status:  (x 1)     Children: 3 children         Tobacco/Alcohol/Supplements:     Last Reviewed on 7/11/2019 11:32 AM by Jaky Vasquez    Tobacco: She has never smoked.          Alcohol: Frequency: Socially         Substance Abuse History:     Last Reviewed on 11/16/2016 04:49 PM by Nathalia Gregory        Mental Health History:     Last Reviewed on 11/16/2016 04:49 PM by Nathalia Gregory        Communicable Diseases (eg STDs):     Last Reviewed on 11/16/2016 04:49 PM by Nathalia Gregory            Current Problems:     Last Reviewed on 11/16/2016 04:49 PM by Nathalia Gregory    Anxiety with depression     Fatigue     Leukocytopenia, unspecified     Endometriosis, site unspecified     Diffuse arthralgia     Atopic dermatitis, other     GERD     Allergies     Chronic insomnia     Generalized anxiety disorder     Screening for depression     Flank pain         Immunizations:     Fluzone Quadrivalent (3+ years) 10/1/2018     Influenza Virus Vaccine, unspecified formulation 10/1/2017         Allergies:     Last Reviewed on 7/11/2019 11:31 AM by Jaky Vasquez    Penicillins:    Sulfonamides:    Morphine Sulfate:        Current Medications:     Last Reviewed on 7/11/2019 11:31 AM by Jaky Vasquez    Lo Loestrin Fe Biphasic Tablet Take 1 tablet(s) by mouth daily as directed.     folic acid OTC daily     Naproxen 500mg Tablet 1 tab bid     Cetirizine HCl 10mg Tablet 1 tab daily      Multivitamin/Mineral Supplement         OBJECTIVE:        Vitals:         Current: 7/11/2019 11:33:04 AM    Ht:  5 ft, 5 in;  Wt: 142.4 lbs;  BMI: 23.7    T: 98.2 F (oral);  BP: 128/83 mm Hg (right arm, sitting);  P: 76 bpm (right arm (BP Cuff), sitting);  sCr: 0.75 mg/dL;  GFR: 93.59        Exams:     PHYSICAL EXAM:     GENERAL: well developed, well nourished;  no apparent distress;     RESPIRATORY: normal respiratory rate and pattern with no distress; normal breath sounds with no rales, rhonchi, wheezes or rubs;     CARDIOVASCULAR: normal rate; rhythm is regular;     NEUROLOGIC: mental status: alert and oriented x 3; GROSSLY INTACT     PSYCHIATRIC: appropriate affect and demeanor; normal psychomotor function; normal speech pattern; normal thought and perception;         ASSESSMENT           300.4   F41.3  Anxiety with depression              DDx:     V79.0   Z13.31  Screening for depression              DDx:         ORDERS:         Meds Prescribed:       Citalopram Hydrobromide 10mg Tablet 1 tab daily  #30 (Thirty) tablet(s) Refills: 1         Radiology/Test Orders:       3014F  Screening mammography results documented and reviewed (PV)1  (In-House)           Lab Orders:       APPTO  Appointment need  (In-House)           Other Orders:         Depression screen positive and follow up plan documented  (In-House)           Negative EtOH screen  (In-House)                   PLAN:          Anxiety with depression Seek care immediately for worsening symptoms. Declines counseling at this time but has resources in the event that she decides.     MIPS Vaccines Flu and Pneumonia updated in Shot record Screening mammomgram done within last 2 years and results in are chart     FOLLOW-UP: Schedule a follow-up appointment in 6 weeks.            Prescriptions:       Citalopram Hydrobromide 10mg Tablet 1 tab daily  #30 (Thirty) tablet(s) Refills: 1           Orders:       3014F  Screening mammography results  documented and reviewed (PV)1  (In-House)         APPTO  Appointment need  (In-House)            Screening for depression     MIPS PHQ-9 Depression Screening: Completed form scanned and in chart; Total Score 11 Positive Depression Screen: Suicide Risk Assessment completed--denies suicidal/homicidal ideation; Referral will be initated to provider qualified to treat depression; Pharmacologic intervention initiated/modified Negative alcohol screen           Orders:         Depression screen positive and follow up plan documented  (In-House)           Negative EtOH screen  (In-House)               Patient Recommendations:        For  Anxiety with depression:     Schedule a follow-up visit in 6 weeks.              CHARGE CAPTURE           **Please note: ICD descriptions below are intended for billing purposes only and may not represent clinical diagnoses**        Primary Diagnosis:         300.4 Anxiety with depression            F41.3    Other mixed anxiety disorders              Orders:          71374   Office/outpatient visit; established patient, level 3  (In-House)             3014F   Screening mammography results documented and reviewed (PV)1  (In-House)             APPTO   Appointment need  (In-House)           V79.0 Screening for depression            Z13.31    Encounter for screening for depression              Orders:             Depression screen positive and follow up plan documented  (In-House)                Negative EtOH screen  (In-House)

## 2021-05-18 NOTE — PROGRESS NOTES
Ham Gonzalez AMNA  1975     Office/Outpatient Visit    Visit Date:  02:02 pm    Provider: Mariah Gonzalez N.P. (Assistant: Jaky Vasquez MA)    Location: [facility]        Electronically signed by Mariah Gonzalez N.P. on  2021 02:37:02 PM                             Subjective:        CC: Mrs. Gonzalez is a 45 year old White female.  Patient presents today with complaints of L hand pain and swelling X 2 weeks;         HPI:       Ham presents with c/o left hand pain and swelling. Has been taking Ibuprofen and Naproxen. She does repetitive work at work. No injury. She is right handed. This started about 2 weeks ago. Using icy heat, applying heat. Some numbness and tingling.    ROS:     CONSTITUTIONAL:  Negative for chills and fever.      CARDIOVASCULAR:  Negative for chest pain and palpitations.      RESPIRATORY:  Negative for dyspnea and frequent wheezing.      MUSCULOSKELETAL:  Positive for left hand pain and swelling.      NEUROLOGICAL:  Positive for paresthesias.      PSYCHIATRIC:  Negative for depression and suicidal thoughts.          Past Medical History / Family History / Social History:         Last Reviewed on 2021 02:22 PM by Mariah Gonzalez    Past Medical History: chronic insomnia    h/o anxiety/depression    allergies    GERD    chronic rash     miscarriage    endometriosis                 PAST MEDICAL HISTORY             GYNECOLOGICAL HISTORY:     miscarriage 2         PREVENTIVE HEALTH MAINTENANCE             MAMMOGRAM: Done within last 2 years and results in are chart was last done  with normal results Dr. Newsome     PAP SMEAR: was last done  with normal results Dr Newsome         Surgical History:         Hysterectomy: 10/30/2020;     EGD ;    abdominal laproscopies for endometriosis;    D& C X 2 for miscarriages;         Family History:         Positive for Myocardial Infarction ( mother ).      Positive for Breast Cancer ( mat. GM ) and  Ovarian Cancer ( mat. GM ).          Social History:     Occupation: [employer]     Marital Status:  (x 1)     Children: 3 children         Tobacco/Alcohol/Supplements:     Last Reviewed on 3/27/2020 10:05 AM by One, [employer]    Tobacco: She has never smoked.          Alcohol: Frequency: Socially     Caffeine:  She admits to consuming caffeine via tea ( -every now and then ) and soda ( couple servings per week ).          Substance Abuse History:     Last Reviewed on 11/16/2016 04:49 PM by Nathalia Gregory        Mental Health History:     Last Reviewed on 11/16/2016 04:49 PM by Nathalia Gregory        Communicable Diseases (eg STDs):     Last Reviewed on 11/16/2016 04:49 PM by Nathalia Gregory        Current Problems:     Last Reviewed on 11/16/2016 04:49 PM by Nathalia Gregory    Allergies    Gastro-esophageal reflux disease without esophagitis    Atopic dermatitis, unspecified    Other dorsalgia    Primary generalized (osteo)arthritis    Endometriosis, unspecified    Decreased white blood cell count, unspecified    Other fatigue    Other mixed anxiety disorders    Screening for depression    Encounter for screening for depression    Pain in joints of left hand        Immunizations:     Fluzone Quadrivalent (3+ years) 10/1/2018    Influenza Virus Vaccine, unspecified formulation 10/1/2017        Allergies:     Last Reviewed on 4/23/2021 02:04 PM by Jaky Vasquez    Penicillins:      Sulfonamides:      Morphine Sulfate:          Current Medications:     Last Reviewed on 4/23/2021 02:06 PM by Jaky Vasquez    Multivitamin/Mineral Supplement     Cetirizine HCl 10mg Tablet [1 tab daily]    folic acid OTC daily        Objective:        Vitals:         Current: 4/23/2021 2:08:24 PM    Ht:  5 ft, 5 in;  Wt: 158.2 lbs;  BMI: 26.3T: 98 F (temporal);  BP: 117/81 mm Hg (right arm, sitting);  P: 86 bpm (right arm (BP Cuff), sitting);  sCr: 0.75 mg/dL;  GFR: 95.88        Exams:     PHYSICAL EXAM:     GENERAL:  well developed, well nourished;  no apparent distress;     RESPIRATORY: normal respiratory rate and pattern with no distress; normal breath sounds with no rales, rhonchi, wheezes or rubs;     CARDIOVASCULAR: normal rate; rhythm is regular;     BREAST/INTEGUMENT: SKIN: no significant rashes or lesions; no suspicious moles;     MUSCULOSKELETAL: normal gait; decreased range of motion noted in: left hand/wrist;  pain with range of motion in: left wrist; OTHER (enter);     NEUROLOGIC: mental status: alert and oriented x 3; GROSSLY INTACT     PSYCHIATRIC: appropriate affect and demeanor;         Assessment:         M25.542   Pain in joints of left hand       Z13.31   Encounter for screening for depression           ORDERS:         Meds Prescribed:       [New Rx] predniSONE 10 mg oral tablet [take 3 tablets by oral route once daily for 5 days], #15 (fifteen) tablets, Refills: 0 (zero)         Lab Orders:       APPTO  Appointment need  (In-House)              Other Orders:         Depression screen negative  (In-House)                      Plan:         Pain in joints of left handAdvised rest, ice, brace, oral steroids.    MIPS Vaccines Flu and Pneumonia updated in Shot record     FOLLOW-UP: Schedule a follow-up appointment in 1 week.  School/Work Excuse for Yesterday Today Saturday, Sunday, Monday           Prescriptions:       [New Rx] predniSONE 10 mg oral tablet [take 3 tablets by oral route once daily for 5 days], #15 (fifteen) tablets, Refills: 0 (zero)           Orders:       APPTO  Appointment need  (In-House)              Encounter for screening for depression    MIPS PHQ-9 Depression Screening: Completed form scanned and in chart; Total Score 4; Negative Depression Screen           Orders:         Depression screen negative  (In-House)                  Patient Recommendations:        For  Pain in joints of left hand:    Schedule a follow-up visit in 1 week.              Charge Capture:         Primary  Diagnosis:     M25.542  Pain in joints of left hand           Orders:      17702  Office/outpatient visit; established patient, level 3  (In-House)            APPTO  Appointment need  (In-House)              Z13.31  Encounter for screening for depression           Orders:        Depression screen negative  (In-House)

## 2021-05-18 NOTE — PROGRESS NOTES
Ham GonzalezChristiano 1975     Office/Outpatient Visit    Visit Date:  12:41 pm    Provider: Mariah Gonzalez N.P. (Assistant: Jessica Dunbar RN)    Location: [facility]        Electronically signed by Mariah Gonzalez N.P. on  2019 02:38:23 PM                             SUBJECTIVE:        CC:     Mrs. Gonzalez is a 43 year old White female.  Pt states she gets very fatigue doing simple task..states she cannot wait to see Dr. Daniels;         HPI: Ham presents with c/o fatigue. She was seen in the ER 19 for c/o chest pain. Had normal chest xray and lab work at that time including Ddimer, Troponin, and flu testing. She was diagnosed with costochronditis and prescribed Naproxen. She was seen here at [facility] on 2019 and had normal ECG. Has appointment with cardiology Dr Daniels on 3/12/19. She notes today that she has continued to feel fatigued. Tried to go back to work but was unable to keep up with her physically demanding job. She has had some shortness of breath as well. Moving around makes the shortness of breath worse. Chest pain has improved.             ROS:     CONSTITUTIONAL:  Negative for chills and fever.      EYES:  Negative for eye drainage and eye pain.      E/N/T:  Negative for ear pain and sore throat.      CARDIOVASCULAR:  Positive for chest pain.   Negative for palpitations.      RESPIRATORY:  Positive for dyspnea.   Negative for frequent wheezing.      GASTROINTESTINAL:  Negative for abdominal pain and vomiting.          PM/[facility]/:     Last Reviewed on 10/31/2018 02:54 PM by Madeline Barrett    Past Medical History: chronic insomnia    h/o anxiety/depression    allergies    GERD    chronic rash     miscarriage    endometriosis                 PAST MEDICAL HISTORY             GYNECOLOGICAL HISTORY:     miscarriage 2         Surgical History:         EGD ;    abdominal laproscopies for endometriosis, DNC misscarriage X 3;         Family History:          Positive for Myocardial Infarction ( mother ).      Positive for Breast Cancer ( mat. GM ) and Ovarian Cancer ( mat. GM ).          Social History:     Occupation: [employer];     Marital Status:  (x 1)     Children: 3 children         Tobacco/Alcohol/Supplements:     Last Reviewed on 2/20/2019 10:19 AM by Jessica Dunbar    Tobacco: She has never smoked.          Alcohol: Frequency: Socially         Substance Abuse History:     Last Reviewed on 11/16/2016 04:49 PM by Nathalia Gregory        Mental Health History:     Last Reviewed on 11/16/2016 04:49 PM by Nathalia Gregory        Communicable Diseases (eg STDs):     Last Reviewed on 11/16/2016 04:49 PM by Nathalia Gregory            Current Problems:     Last Reviewed on 11/16/2016 04:49 PM by Nathalia Gregory    Leukocytopenia, unspecified     Endometriosis, site unspecified     Diffuse arthralgia     Atopic dermatitis, other     GERD     Allergies     Chronic insomnia     Generalized anxiety disorder     Chest pain, other type     Flank pain         Immunizations:     Fluzone Quadrivalent (3+ years) 10/1/2018     Influenza Virus Vaccine, unspecified formulation 10/1/2017         Allergies:     Last Reviewed on 2/27/2019 12:42 PM by Jessica Dunbar    Penicillins:    Sulfonamides:    Morphine Sulfate:        Current Medications:     Last Reviewed on 2/27/2019 12:43 PM by Jessica Dunbar    Lo Loestrin Fe Biphasic Tablet Take 1 tablet(s) by mouth daily as directed.     folic acid OTC daily     Naproxen 500mg Tablet 1 tab bid     Cetirizine HCl 10mg Tablet 1 tab daily     Multivitamin/Mineral Supplement         OBJECTIVE:        Vitals:         Current: 2/27/2019 12:45:19 PM    Ht:  5 ft, 5 in;  Wt: 143.8 lbs;  BMI: 23.9    T: 97.8 F (oral);  BP: 123/62 mm Hg (right arm, sitting);  P: 96 bpm (right arm (BP Cuff), sitting);  sCr: 0.82 mg/dL;  GFR: 85.96    O2 Sat: 100 % (room air)        Exams:     PHYSICAL EXAM:     GENERAL: well developed, well  nourished;  no apparent distress;     EYES: PERRL, EOMI     E/N/T: EARS: external auditory canal normal;  bilateral TMs are normal;  NOSE: normal turbinates; no sinus tenderness; OROPHARYNX: oral mucosa is normal; posterior pharynx shows no exudate;     NECK: range of motion is normal; trachea is midline;     RESPIRATORY: normal respiratory rate and pattern with no distress; normal breath sounds with no rales, rhonchi, wheezes or rubs;     CARDIOVASCULAR: normal rate; rhythm is regular;     GASTROINTESTINAL: nontender; normal bowel sounds; no organomegaly;     MUSCULOSKELETAL: normal gait;     NEUROLOGIC: mental status: alert and oriented x 3; GROSSLY INTACT     PSYCHIATRIC: appropriate affect and demeanor;         ASSESSMENT           780.79   R53.83  Fatigue              DDx:     786.09   R06.02  Shortness of breath              DDx:         ORDERS:         Lab Orders:       [id number]  [facility] CBC, CMP, TSH, B12 levels FATIGUE PANEL  (Send-Out)         15131  [facility] Rapid [place]  (Send-Out)         76157  D-DIM - [facility] D-Dimer  (Send-Out)         18725  VITD - [facility] Vitamin D, 25 Hydroxy  (Send-Out)           Other Orders:       [id number]  Noninvasive ear or pulse oximetry for oxygen saturation; single determination  (In-House)                   PLAN:          Fatigue No concerning findings on exam. Checking labs. Keep scheduled appt with cardiology. Further recommendations to follow. ER precautions given. Off work the rest of the week.     LABORATORY:  Labs ordered to be performed today include Female Fatigue Panel (CBC, CMP, TSH, B12), [place] spot at [facility], and Vitamin D.      RECOMMENDATIONS given include: Further recommendation to be given after test results are complete.      FOLLOW-UP: Schedule follow-up appointments on a p.r.n. basis. for after testing School/Work Excuse for Today Tomorrow Friday           Orders:       [id number]  [facility] CBC, CMP, TSH, B12 levels FATIGUE PANEL  (Send-Out)         25757  [facility]  [place]apid [place]  (Send-Out)         23063  VITD - [facility] Vitamin D, 25 Hydroxy  (Send-Out)            Shortness of breath     LABORATORY:  Labs ordered to be performed today include D-Dimer.      RECOMMENDATIONS given include: Further recommendation to be given after test results are complete.            Orders:       50051  Noninvasive ear or pulse oximetry for oxygen saturation; single determination  (In-House)         56939  D-DIM - [facility] D-Dimer  (Send-Out)               Patient Recommendations:        For  Fatigue:     Schedule follow-up appointments as needed.              CHARGE CAPTURE           **Please note: ICD descriptions below are intended for billing purposes only and may not represent clinical diagnoses**        Primary Diagnosis:         780.79 Fatigue            R53.83    Other fatigue              Orders:          17045   Office/outpatient visit; established patient, level 3  (In-House)           786.09 Shortness of breath            R06.02    Shortness of breath              Orders:          39001   Noninvasive ear or pulse oximetry for oxygen saturation; single determination  (In-House)

## 2021-05-18 NOTE — PROGRESS NOTES
Ham Gonzalez AMNA  1975     Office/Outpatient Visit    Visit Date: Fri, Mar 27, 2020 10:02 am    Provider: Mariah Gonzalez N.P. (Assistant: Anne Richter LPN)    Location: [facility]        Electronically signed by Mariah Gonzalez N.P. on  2020 12:55:04 PM                             Subjective:        CC: Mrs. Gonzalez is a 44 year old White female.  pt needing note to return to work. left work with upset stomach, states she believes its from her nerves/anxiety. she is having issues with her x . no other symptoms, no n/v/d, no fever.;         HPI: Ham is being seen via telemedicine this morning. Her consent for this has been obtained. She is following up on her anxiety and depression. She was previously on Citalopram. She weaned herself off of this and had been doing well. She notes that she has recently had increased anxiety due to coronavirus pandemic and stressors dealing with her exhusband. She notes that she has felt nervous and quick to anger. Denies suicidal ideation. Missed work Wednesday, yesterday, and today and needs note to return. Does not feel that she needs to restart any medication at this time. She is not seeing counselor or therapist.    ROS:     CONSTITUTIONAL:  Negative for chills and fever.      CARDIOVASCULAR:  Negative for chest pain and palpitations.      RESPIRATORY:  Negative for dyspnea and frequent wheezing.      NEUROLOGICAL:  Negative for dizziness and headaches.      PSYCHIATRIC:  Positive for anxiety.   Negative for suicidal thoughts.          Past Medical History / Family History / Social History:         Last Reviewed on 2019 10:47 AM by Mariah Gonzalez    Past Medical History: chronic insomnia    h/o anxiety/depression    allergies    GERD    chronic rash     miscarriage    endometriosis                 PAST MEDICAL HISTORY             GYNECOLOGICAL HISTORY:     miscarriage 2         PREVENTIVE HEALTH MAINTENANCE             MAMMOGRAM: Done within  last 2 years and results in are chart was last done 8/30/18 with normal results Dr. Newsome- scheduled 9/5/19     PAP SMEAR: was last done 3/2019 with normal results         Surgical History:         EGD 2013;    abdominal laproscopies for endometriosis;    D& C X 2 for miscarriages;         Family History:         Positive for Myocardial Infarction ( mother ).      Positive for Breast Cancer ( mat. GM ) and Ovarian Cancer ( mat. GM ).          Social History:     Occupation: [employer];     Marital Status:  (x 1)     Children: 3 children         Tobacco/Alcohol/Supplements:     Last Reviewed on 9/04/2019 10:31 AM by Jaky Vasquez    Tobacco: She has never smoked.          Alcohol: Frequency: Socially         Substance Abuse History:     Last Reviewed on 11/16/2016 04:49 PM by Nathalia Gregory        Mental Health History:     Last Reviewed on 11/16/2016 04:49 PM by Nathalia Gregory        Communicable Diseases (eg STDs):     Last Reviewed on 11/16/2016 04:49 PM by Nathalia Gregory        Current Problems:     Last Reviewed on 11/16/2016 04:49 PM by Nathalia Gregory    Generalized anxiety disorder    GERD    Generalized anxiety disorder    Chronic insomnia    Allergies    Gastro-esophageal reflux disease without esophagitis    Atopic dermatitis, unspecified    Atopic dermatitis, other    Flank pain    Other dorsalgia    Primary generalized (osteo)arthritis    Diffuse arthralgia    Endometriosis, site unspecified    Endometriosis, unspecified    Decreased white blood cell count, unspecified    Leukocytopenia, unspecified    Other fatigue    Fatigue    Other mixed anxiety disorders    Anxiety with depression    Encounter for screening for depression    Screening for depression        Immunizations:     Fluzone Quadrivalent (3+ years) 10/1/2018    Influenza Virus Vaccine, unspecified formulation 10/1/2017        Allergies:     Last Reviewed on 9/04/2019 10:30 AM by Jaky Vasquez    Penicillins:       Sulfonamides:      Morphine Sulfate:          Current Medications:     Last Reviewed on 9/04/2019 10:30 AM by Jaky Vasquez Loestrin Fe Biphasic Tablet [Take 1 tablet(s) by mouth daily as directed.]    Multivitamin/Mineral Supplement     Cetirizine HCl 10mg Tablet [1 tab daily]    Naproxen 500mg Tablet [1 tab bid]    folic acid OTC daily        Objective:        Vitals: temp checked at home. no access to check bp or weight- clr         Current: 3/27/2020 10:06:18 AM    Ht:  5 ft, 5 inT: 97.9 F (oral);  sCr: 0.75 mg/dL;  GFR: 93.60        Exams:     PHYSICAL EXAM:     NEUROLOGIC: mental status: alert; oriented to person, place, and time;     PSYCHIATRIC: normal speech pattern;         Assessment:         F41.3   Other mixed anxiety disorders           ORDERS:         Other Orders:         Screening mammogram results documented  (Send-Out)                      Plan:         Other mixed anxiety disordersDiscussed recommendation for counseling for patient. List provided along with work note. Advised her to seek care immediately for worsening symptoms such as suicidal ideation. Verbalized understanding. Work note provided.    MIPS Vaccines Flu and Pneumonia updated in Shot record Screening mammomgram done within last 2 years and results in are chart Telehealth: Verbal consent obtained for visit to occur via phone call; Staff, other than provider, present during telephone visit include Anne Richter LPN; Total time spent was 14 minutes; 18502--[facility] E/M 11-20 minutes     FOLLOW-UP: for Annual Checkup:Patient will call to schedule follow-up appointment School/Work Excuse for Wednesday, Thursday, today May return to work on Monday.           Orders:         Screening mammogram results documented  (Send-Out)                Patient Education Handouts:       Mental Health and Substance Abuse [facility]              Charge Capture:         Primary Diagnosis:     F41.3  Other mixed anxiety  disorders           Orders:      [id number]  Phys/[facility]HP telephone evaluation 11-20 minutes  (In-House)

## 2021-05-18 NOTE — PROGRESS NOTES
Ham Gonzalez MASSIEL 1975     Office/Outpatient Visit    Visit Date:  10:16 am    Provider: Mariah Gonzalez N.P. (Assistant: Jessica Dunbar RN)    Location: [facility]        Electronically signed by Mariah Gonzalez N.P. on  2019 11:25:32 AM                             SUBJECTIVE:        CC:     Mrs. Gonzalez is a 43 year old White female.  She is here today following a transition of care from the emergency department ( [place] [facility] on 19 for chest pain and left arm ).          HPI:         Patient complains of chest pain, other type.  The discomfort is located primarily in the in the center of the chest.  It radiates to the left arm.  The pain initially began 3 days ago.  Pain improves with NTG.  Associated symptoms include numbness ( Left arm ), tingling ( Left arm ), palpitations, dyspnea, lightheaded and fatigue.  She denies associated nausea or diaphoresis.  Prior workup includes an ECG and a treadmill stress test.  Seen in ER Monday. Had normal chest xray and blood work including D dimer, Troponin, and flu testing in ER. Diagnosed with costochroniditis. Prescribed Naproxen in ER. Still feeling fatigued and having chest pain.     ROS:     CONSTITUTIONAL:  Positive for fatigue.   Negative for fever.      EYES:  Negative for eye drainage and eye pain.      E/N/T:  Negative for ear pain and sore throat.      CARDIOVASCULAR:  Positive for chest pain and palpitations.      RESPIRATORY:  Positive for dyspnea.   Negative for frequent wheezing.      GASTROINTESTINAL:  Negative for abdominal pain and vomiting.      NEUROLOGICAL:  Negative for dizziness and headaches.          PMH/FMH/SH:     Last Reviewed on 10/31/2018 02:54 PM by Madeline Barrett    Past Medical History: chronic insomnia    h/o anxiety/depression    allergies    GERD    chronic rash     miscarriage    endometriosis                 PAST MEDICAL HISTORY             GYNECOLOGICAL HISTORY:     miscarriage 2          Surgical History:         EGD 2013;    abdominal laproscopies for endometriosis, DNC misscarriage X 3;         Family History:         Positive for Myocardial Infarction ( mother ).      Positive for Breast Cancer ( mat. GM ) and Ovarian Cancer ( mat. GM ).          Social History:     Occupation: [employer];     Marital Status:  (x 1)     Children: 3 children         Tobacco/Alcohol/Supplements:     Last Reviewed on 11/09/2018 02:21 PM by Jaky Vasquez    Tobacco: She has never smoked.          Alcohol: Frequency: Socially         Substance Abuse History:     Last Reviewed on 11/16/2016 04:49 PM by Nathalia Gregory        Mental Health History:     Last Reviewed on 11/16/2016 04:49 PM by Nathalia Gregory        Communicable Diseases (eg STDs):     Last Reviewed on 11/16/2016 04:49 PM by Nathalia Gregory            Current Problems:     Last Reviewed on 11/16/2016 04:49 PM by Nathalia Gregory    Leukocytopenia, unspecified     Endometriosis, site unspecified     Diffuse arthralgia     Atopic dermatitis, other     GERD     Allergies     Chronic insomnia     Generalized anxiety disorder     Flank pain         Immunizations:     Fluzone Quadrivalent (3+ years) 10/1/2018     Influenza Virus Vaccine, unspecified formulation 10/1/2017         Allergies:     Last Reviewed on 2/20/2019 10:19 AM by Jessica Dunbar    Penicillins:    Sulfonamides:    Morphine Sulfate:        Current Medications:     Last Reviewed on 2/20/2019 10:21 AM by Jessica Dunbar    Lo Loestrin Fe Biphasic Tablet Take 1 tablet(s) by mouth daily as directed.     Cetirizine HCl 10mg Tablet 1 tab daily     Multivitamin/Mineral Supplement     folic acid OTC daily     Naproxen 500mg Tablet 1 tab bid         OBJECTIVE:        Vitals:         Current: 2/20/2019 10:22:19 AM    Ht:  5 ft, 5 in;  Wt: 143 lbs;  BMI: 23.8    T: 98.1 F (oral);  BP: 125/79 mm Hg (right arm, sitting);  P: 92 bpm (right arm (BP Cuff), sitting);  sCr: 0.82 mg/dL;   GFR: 85.75    O2 Sat: 97 % (room air)        Exams:     PHYSICAL EXAM:     GENERAL: well developed, well nourished;  no apparent distress;     EYES: PERRL, EOMI     E/N/T: EARS: external auditory canal normal;  bilateral TMs are normal;  NOSE: normal turbinates; no sinus tenderness; OROPHARYNX: oral mucosa is normal; posterior pharynx shows no exudate;     RESPIRATORY: normal respiratory rate and pattern with no distress; normal breath sounds with no rales, rhonchi, wheezes or rubs;     CARDIOVASCULAR: normal rate; rhythm is regular;     MUSCULOSKELETAL: normal gait;     NEUROLOGIC: mental status: alert and oriented x 3; GROSSLY INTACT     PSYCHIATRIC: appropriate affect and demeanor;         Lab/Test Results:         LABORATORY RESULTS: EKG performed by tls         ASSESSMENT           786.59   R07.89  Chest pain, other type              DDx:         ORDERS:         Radiology/Test Orders:       [id number]  Electrocardiogram, routine with at least 12 leads; with interpretation and report  (In-House)           Procedures Ordered:       REFER  Referral to Specialist or Other Facility  (Send-Out)                   PLAN:          Chest pain, other type ECG without acute ST or T wave abnormalities. ER records reviewed. Advised to continue Naproxen as prescribed. ER precautions given.         TESTS/PROCEDURES:  Will proceed with an ECG to be performed/scheduled now.      REFERRALS:  Referral initiated to a cardiologist ( Rylee Gong, and [facility] ([facility]) ).      RECOMMENDATIONS given include: rest.      FOLLOW-UP: Schedule follow-up appointments on a p.r.n. basis. Chronic visit follow up School/Work Excuse for Today Tomorrow Saturday, Sunday           Orders:       [id number]  Electrocardiogram, routine with at least 12 leads; with interpretation and report  (In-House)         REFER  Referral to Specialist or Other Facility  (Send-Out)               Patient Recommendations:        For  Chest pain, other type:     Get plenty  of rest.  Schedule follow-up appointments as needed.              CHARGE CAPTURE           **Please note: ICD descriptions below are intended for billing purposes only and may not represent clinical diagnoses**        Primary Diagnosis:         786.59 Chest pain, other type            R07.89    Other chest pain              Orders:          96312   Office/outpatient visit; established patient, level 4  (In-House)             73321   Electrocardiogram, routine with at least 12 leads; with interpretation and report  (In-House)

## 2021-05-26 ENCOUNTER — OFFICE VISIT CONVERTED (OUTPATIENT)
Dept: FAMILY MEDICINE CLINIC | Age: 46
End: 2021-05-26
Attending: NURSE PRACTITIONER

## 2021-06-05 NOTE — PROGRESS NOTES
Ham Gonzalez AMNA  1975     Office/Outpatient Visit    Visit Date: Wed, May 26, 2021 12:58 pm    Provider: Merlene Nathan N.P. (Assistant: Anahi Booker,  )    Location: [facility]        Electronically signed by Merlene Nathan N.P. on  2021 01:37:23 PM                             Subjective:        CC: Mrs. Gonzalez is a 45 year old White female.  Pt present with nausea, vomiting, darrhea. Pt states symptoms began 2021.;         HPI:           Mrs. Gonzalez presents with nausea with vomiting, unspecified.      nausea and vomiting This has been noted for the past two days.  Associated symptoms include diarrhea.  Co workers had GI issues last week.(diarrhea has stopped) has vomited 2 X today    ROS:     CONSTITUTIONAL:  Positive for fever ( low grade ).      CARDIOVASCULAR:  Negative for chest pain, palpitations, tachycardia, orthopnea, and edema.      RESPIRATORY:  Negative for recent cough and dyspnea.      GASTROINTESTINAL:  Positive for anorexia.      NEUROLOGICAL:  Negative for dizziness, headaches, paresthesias, and weakness.          Past Medical History / Family History / Social History:         Last Reviewed on 2021 01:11 PM by Merlene Nathan    Past Medical History: chronic insomnia    h/o anxiety/depression    allergies    GERD    chronic rash     miscarriage    endometriosis                 PAST MEDICAL HISTORY             GYNECOLOGICAL HISTORY:     miscarriage 2         PREVENTIVE HEALTH MAINTENANCE             COLORECTAL CANCER SCREENING: declines colorectal cancer screening, understands reason for testing     MAMMOGRAM: Done within last 2 years and results in are chart was last done 10/16/2020 with normal results Dr. Newsome     PAP SMEAR: was last done  with normal results Dr Newsome         Surgical History:         Hysterectomy: 10/30/2020;     EGD ;    abdominal laproscopies for endometriosis;    D& C X 2 for miscarriages;         Family  History:         Positive for Myocardial Infarction ( mother ).      Positive for Breast Cancer ( mat. GM ) and Ovarian Cancer ( mat. GM ).          Social History:     Occupation: [employer]     Marital Status:  (x 1)     Children: 3 children         Tobacco/Alcohol/Supplements:     Last Reviewed on 5/26/2021 01:02 PM by Anahi Booker    Tobacco: She has never smoked.          Alcohol: Frequency: Socially     Caffeine:  She admits to consuming caffeine via tea ( -every now and then ) and soda ( couple servings per week ).          Substance Abuse History:     Last Reviewed on 11/16/2016 04:49 PM by Nathalia Gregory        Mental Health History:     Last Reviewed on 11/16/2016 04:49 PM by Nathalia Gregory        Communicable Diseases (eg STDs):     Last Reviewed on 11/16/2016 04:49 PM by Nathalia Gregory        Immunizations:     Fluzone Quadrivalent (3+ years) 10/1/2018    Influenza Virus Vaccine, unspecified formulation 10/1/2017        Allergies:     Last Reviewed on 5/26/2021 01:00 PM by Anahi Booker    Penicillins:      Sulfonamides:      Morphine Sulfate:          Current Medications:     Last Reviewed on 5/26/2021 01:00 PM by Anahi Booker    Multivitamin/Mineral Supplement     cetirizine 10 mg oral tablet [1 tab daily]    folic acid OTC daily     diclofenac sodium 75 mg oral tablet, delayed release (enteric coated) [take 1 tablet (75 mg) by oral route every 12 hours as needed]        Objective:        Vitals:         Current: 5/26/2021 1:00:14 PM    Ht:  5 ft, 5 in;  Wt: 152.8 lbs;  BMI: 25.4T: 97.3 F (temporal);  BP: 125/85 mm Hg (right arm, sitting);  P: 84 bpm (right arm (BP Cuff), sitting);  sCr: 0.76 mg/dL;  GFR: 93.23        Exams:     PHYSICAL EXAM:     GENERAL: vital signs recorded - well developed, well nourished;  no apparent distress;     RESPIRATORY: normal respiratory rate and pattern with no distress; normal breath sounds with no rales, rhonchi, wheezes or rubs;      CARDIOVASCULAR: normal rate; rhythm is regular;  no systolic murmur;     GASTROINTESTINAL: mild diffuse tenderness;  no guarding;  no rebound tenderness;     PSYCHIATRIC:  appropriate affect and demeanor; normal speech pattern; grossly normal memory;         Assessment:         R11.2   Nausea with vomiting, unspecified           ORDERS:         Meds Prescribed:       [New Rx] Zofran 4 mg oral tablet [1-2 TID prn N&V], #20 (twenty) tablets, Refills: 0 (zero)                 Plan:         Nausea with vomiting, unspecifiedoff work 5-25,26,27        RECOMMENDATIONS given include: get plenty of rest and maintain a clear liquid diet advance diet as tolerated.      FOLLOW-UP: Advised to call if there is no improvement in 2 day(s).            Prescriptions:       [New Rx] Zofran 4 mg oral tablet [1-2 TID prn N&V], #20 (twenty) tablets, Refills: 0 (zero)             Patient Recommendations:        For  Nausea with vomiting, unspecified:    Get plenty of rest.     Maintain a diet consisting of clear liquids (clear beverages, broth, gelatin, flavored ices, pediatric electrolyte solutions, sports drinks, etc.).  Avoid solid foods or formula.              Charge Capture:         Primary Diagnosis:     R11.2  Nausea with vomiting, unspecified           Orders:      [id number]  Office/outpatient visit; established patient, level 3  (In-House)

## 2021-07-01 VITALS
SYSTOLIC BLOOD PRESSURE: 125 MMHG | DIASTOLIC BLOOD PRESSURE: 79 MMHG | TEMPERATURE: 98.1 F | WEIGHT: 143 LBS | HEIGHT: 65 IN | HEART RATE: 92 BPM | OXYGEN SATURATION: 97 % | BODY MASS INDEX: 23.82 KG/M2

## 2021-07-01 VITALS
HEIGHT: 65 IN | DIASTOLIC BLOOD PRESSURE: 62 MMHG | WEIGHT: 143.8 LBS | BODY MASS INDEX: 23.96 KG/M2 | SYSTOLIC BLOOD PRESSURE: 123 MMHG | HEART RATE: 96 BPM | TEMPERATURE: 97.8 F | OXYGEN SATURATION: 100 %

## 2021-07-01 VITALS
SYSTOLIC BLOOD PRESSURE: 117 MMHG | HEIGHT: 65 IN | DIASTOLIC BLOOD PRESSURE: 82 MMHG | TEMPERATURE: 97.2 F | BODY MASS INDEX: 22.67 KG/M2 | HEART RATE: 85 BPM | WEIGHT: 136.1 LBS

## 2021-07-01 VITALS
BODY MASS INDEX: 23.76 KG/M2 | HEIGHT: 65 IN | HEART RATE: 103 BPM | SYSTOLIC BLOOD PRESSURE: 126 MMHG | TEMPERATURE: 98.7 F | DIASTOLIC BLOOD PRESSURE: 84 MMHG | WEIGHT: 142.6 LBS

## 2021-07-01 VITALS
TEMPERATURE: 97.7 F | DIASTOLIC BLOOD PRESSURE: 65 MMHG | SYSTOLIC BLOOD PRESSURE: 122 MMHG | HEIGHT: 65 IN | HEART RATE: 78 BPM | BODY MASS INDEX: 23.39 KG/M2 | WEIGHT: 140.4 LBS

## 2021-07-01 VITALS
HEART RATE: 71 BPM | DIASTOLIC BLOOD PRESSURE: 80 MMHG | SYSTOLIC BLOOD PRESSURE: 116 MMHG | HEIGHT: 65 IN | WEIGHT: 140.4 LBS | TEMPERATURE: 98.2 F | BODY MASS INDEX: 23.39 KG/M2

## 2021-07-01 VITALS
HEIGHT: 65 IN | BODY MASS INDEX: 23.72 KG/M2 | TEMPERATURE: 98.2 F | WEIGHT: 142.4 LBS | DIASTOLIC BLOOD PRESSURE: 83 MMHG | SYSTOLIC BLOOD PRESSURE: 128 MMHG | HEART RATE: 76 BPM

## 2021-07-02 VITALS
DIASTOLIC BLOOD PRESSURE: 81 MMHG | HEIGHT: 65 IN | HEART RATE: 86 BPM | SYSTOLIC BLOOD PRESSURE: 117 MMHG | WEIGHT: 158.2 LBS | TEMPERATURE: 98 F | BODY MASS INDEX: 26.36 KG/M2

## 2021-07-02 VITALS
HEART RATE: 84 BPM | DIASTOLIC BLOOD PRESSURE: 85 MMHG | SYSTOLIC BLOOD PRESSURE: 125 MMHG | WEIGHT: 152.8 LBS | HEIGHT: 65 IN | BODY MASS INDEX: 25.46 KG/M2 | TEMPERATURE: 97.3 F

## 2021-07-02 VITALS
DIASTOLIC BLOOD PRESSURE: 85 MMHG | WEIGHT: 158.2 LBS | HEART RATE: 73 BPM | BODY MASS INDEX: 26.36 KG/M2 | HEIGHT: 65 IN | SYSTOLIC BLOOD PRESSURE: 128 MMHG | TEMPERATURE: 95.9 F

## 2021-07-02 VITALS — BODY MASS INDEX: 23.36 KG/M2 | TEMPERATURE: 97.9 F | HEIGHT: 65 IN

## 2024-04-17 ENCOUNTER — OFFICE VISIT (OUTPATIENT)
Dept: FAMILY MEDICINE CLINIC | Age: 49
End: 2024-04-17
Payer: COMMERCIAL

## 2024-04-17 ENCOUNTER — HOSPITAL ENCOUNTER (OUTPATIENT)
Dept: ULTRASOUND IMAGING | Facility: HOSPITAL | Age: 49
Discharge: HOME | End: 2024-04-17
Payer: COMMERCIAL

## 2024-04-17 ENCOUNTER — TRANSCRIBE ORDERS (OUTPATIENT)
Dept: FAMILY MEDICINE CLINIC | Age: 49
End: 2024-04-17

## 2024-04-17 ENCOUNTER — LAB (OUTPATIENT)
Dept: LAB | Facility: HOSPITAL | Age: 49
End: 2024-04-17
Payer: COMMERCIAL

## 2024-04-17 VITALS
BODY MASS INDEX: 22.79 KG/M2 | HEART RATE: 75 BPM | WEIGHT: 136.8 LBS | HEIGHT: 65 IN | DIASTOLIC BLOOD PRESSURE: 80 MMHG | SYSTOLIC BLOOD PRESSURE: 127 MMHG

## 2024-04-17 DIAGNOSIS — M79.661 BILATERAL CALF PAIN: Primary | ICD-10-CM

## 2024-04-17 DIAGNOSIS — I99.9 DECREASED CIRCULATION: ICD-10-CM

## 2024-04-17 DIAGNOSIS — R25.2 BILATERAL LEG CRAMPS: ICD-10-CM

## 2024-04-17 DIAGNOSIS — M79.662 BILATERAL CALF PAIN: Primary | ICD-10-CM

## 2024-04-17 DIAGNOSIS — M79.662 BILATERAL CALF PAIN: ICD-10-CM

## 2024-04-17 DIAGNOSIS — M79.661 BILATERAL CALF PAIN: ICD-10-CM

## 2024-04-17 DIAGNOSIS — R53.83 FATIGUE, UNSPECIFIED TYPE: ICD-10-CM

## 2024-04-17 LAB
25(OH)D3 SERPL-MCNC: 34 NG/ML (ref 30–100)
ALBUMIN SERPL-MCNC: 4.3 G/DL (ref 3.5–5.2)
ALBUMIN/GLOB SERPL: 1.4 G/DL
ALP SERPL-CCNC: 117 U/L (ref 39–117)
ALT SERPL W P-5'-P-CCNC: 17 U/L (ref 1–33)
ANION GAP SERPL CALCULATED.3IONS-SCNC: 10.4 MMOL/L (ref 5–15)
AST SERPL-CCNC: 29 U/L (ref 1–32)
BILIRUB SERPL-MCNC: 0.4 MG/DL (ref 0–1.2)
BUN SERPL-MCNC: 8 MG/DL (ref 6–20)
BUN/CREAT SERPL: 11.4 (ref 7–25)
CALCIUM SPEC-SCNC: 9.6 MG/DL (ref 8.6–10.5)
CHLORIDE SERPL-SCNC: 105 MMOL/L (ref 98–107)
CHOLEST SERPL-MCNC: 193 MG/DL (ref 0–200)
CO2 SERPL-SCNC: 26.6 MMOL/L (ref 22–29)
CREAT SERPL-MCNC: 0.7 MG/DL (ref 0.57–1)
DEPRECATED RDW RBC AUTO: 45.1 FL (ref 37–54)
EGFRCR SERPLBLD CKD-EPI 2021: 106.2 ML/MIN/1.73
ERYTHROCYTE [DISTWIDTH] IN BLOOD BY AUTOMATED COUNT: 13.3 % (ref 12.3–15.4)
GLOBULIN UR ELPH-MCNC: 3.1 GM/DL
GLUCOSE SERPL-MCNC: 98 MG/DL (ref 65–99)
HCT VFR BLD AUTO: 41.9 % (ref 34–46.6)
HDLC SERPL-MCNC: 61 MG/DL (ref 40–60)
HGB BLD-MCNC: 13.5 G/DL (ref 12–15.9)
LDLC SERPL CALC-MCNC: 116 MG/DL (ref 0–100)
LDLC/HDLC SERPL: 1.87 {RATIO}
MAGNESIUM SERPL-MCNC: 2.1 MG/DL (ref 1.6–2.6)
MCH RBC QN AUTO: 29.2 PG (ref 26.6–33)
MCHC RBC AUTO-ENTMCNC: 32.2 G/DL (ref 31.5–35.7)
MCV RBC AUTO: 90.5 FL (ref 79–97)
PLATELET # BLD AUTO: 188 10*3/MM3 (ref 140–450)
PMV BLD AUTO: 10.5 FL (ref 6–12)
POTASSIUM SERPL-SCNC: 4.5 MMOL/L (ref 3.5–5.2)
PROT SERPL-MCNC: 7.4 G/DL (ref 6–8.5)
RBC # BLD AUTO: 4.63 10*6/MM3 (ref 3.77–5.28)
SODIUM SERPL-SCNC: 142 MMOL/L (ref 136–145)
TRIGL SERPL-MCNC: 89 MG/DL (ref 0–150)
VLDLC SERPL-MCNC: 16 MG/DL (ref 5–40)
WBC NRBC COR # BLD AUTO: 4.08 10*3/MM3 (ref 3.4–10.8)

## 2024-04-17 PROCEDURE — 93970 EXTREMITY STUDY: CPT

## 2024-04-17 PROCEDURE — 36415 COLL VENOUS BLD VENIPUNCTURE: CPT

## 2024-04-17 PROCEDURE — 82306 VITAMIN D 25 HYDROXY: CPT

## 2024-04-17 PROCEDURE — 80061 LIPID PANEL: CPT

## 2024-04-17 PROCEDURE — 83735 ASSAY OF MAGNESIUM: CPT

## 2024-04-17 PROCEDURE — 99214 OFFICE O/P EST MOD 30 MIN: CPT | Performed by: NURSE PRACTITIONER

## 2024-04-17 PROCEDURE — 80053 COMPREHEN METABOLIC PANEL: CPT

## 2024-04-17 PROCEDURE — 85027 COMPLETE CBC AUTOMATED: CPT

## 2024-04-17 RX ORDER — MULTIPLE VITAMINS W/ MINERALS TAB 9MG-400MCG
1 TAB ORAL DAILY
COMMUNITY

## 2024-04-17 RX ORDER — CETIRIZINE HYDROCHLORIDE 10 MG/1
10 TABLET ORAL DAILY
COMMUNITY

## 2024-04-17 NOTE — PROGRESS NOTES
"Chief Complaint  Leg Pain (X 2 weeks )    Subjective        Ham Gonzalez presents to [facility]  Leg Pain   The incident occurred more than 1 week ago. There was no injury mechanism. The pain is present in the left leg and right leg. The quality of the pain is described as cramping. The pain is severe. The pain has been Worsening since onset. Associated symptoms include tingling. Pertinent negatives include no numbness. She reports no foreign bodies present. The symptoms are aggravated by weight bearing and movement. She has tried NSAIDs and rest for the symptoms. The treatment provided no relief.       Objective   Vital Signs:  /80 (BP Location: Right arm, Patient Position: Sitting)   Pulse 75   Ht 165.1 cm (65\")   Wt 62.1 kg (136 lb 12.8 oz)   BMI 22.76 kg/m²   Estimated body mass index is 22.76 kg/m² as calculated from the following:    Height as of this encounter: 165.1 cm (65\").    Weight as of this encounter: 62.1 kg (136 lb 12.8 oz).       BMI is within normal parameters. No other follow-up for BMI required.      Physical Exam  HENT:      Head: Normocephalic.   Cardiovascular:      Rate and Rhythm: Normal rate and regular rhythm.      Pulses:           Dorsalis pedis pulses are 1+ on the right side and 1+ on the left side.        Posterior tibial pulses are 1+ on the right side and 1+ on the left side.   Pulmonary:      Effort: Pulmonary effort is normal.   Musculoskeletal:      Right lower leg: No edema.      Left lower leg: No edema.      Right foot: Decreased capillary refill. Abnormal pulse.      Left foot: Decreased capillary refill. Abnormal pulse.      Comments: Positive ye's sign   Skin:     General: Skin is dry.      Capillary Refill: Capillary refill takes more than 3 seconds.   Neurological:      Mental Status: She is alert and oriented to person, place, and time.   Psychiatric:         Mood and Affect: Mood normal.        Result Review " :                     Assessment and Plan     Diagnoses and all orders for this visit:    1. Bilateral calf pain (Primary)  -     US Venous Doppler Lower Extremity Left (duplex); Future  -     US Venous Doppler Lower Extremity Right (duplex); Future    2. Decreased circulation  -     US Venous Doppler Lower Extremity Left (duplex); Future  -     US Venous Doppler Lower Extremity Right (duplex); Future  -     Lipid panel; Future    3. Bilateral leg cramps  -     CBC No Differential; Future  -     Comprehensive metabolic panel; Future  -     Magnesium; Future  -     Vitamin D 25 hydroxy; Future    4. Fatigue, unspecified type  -     CBC No Differential; Future  -     Comprehensive metabolic panel; Future  -     Vitamin D 25 hydroxy; Future             Follow Up     Return if symptoms worsen or fail to improve.  Patient was given instructions and counseling regarding her condition or for health maintenance advice. Please see specific information pulled into the AVS if appropriate.

## 2024-04-17 NOTE — LETTER
April 17, 2024     Patient: Ham Gonzalez   YOB: 1975   Date of Visit: 4/17/2024       To Whom It May Concern:    It is my medical opinion that Ham Gonzalez may return to work on 4/20/2024 .           Sincerely,        JOSETTE Lamas    CC: No Recipients

## 2024-04-17 NOTE — PROGRESS NOTES
Bilateral US of legs negative for blood clot. Complete blood count normal, no signs of infection or anemia at this time.  Further labs pending.

## 2024-04-18 NOTE — PROGRESS NOTES
Overall labs look good. No signs of infection, electrolytes normal, vitamin D normal but on the lower side, okay to start daily OTC 2,000 IU. LDL cholesterol slightly increased. Eating more vegetables can help improve this number. Hope she feels better soon. Recommend resting legs.

## 2025-03-12 ENCOUNTER — OFFICE VISIT (OUTPATIENT)
Dept: FAMILY MEDICINE CLINIC | Age: 50
End: 2025-03-12
Payer: COMMERCIAL

## 2025-03-12 VITALS
OXYGEN SATURATION: 98 % | WEIGHT: 124.8 LBS | DIASTOLIC BLOOD PRESSURE: 77 MMHG | BODY MASS INDEX: 20.79 KG/M2 | TEMPERATURE: 98.3 F | HEART RATE: 86 BPM | SYSTOLIC BLOOD PRESSURE: 107 MMHG | HEIGHT: 65 IN

## 2025-03-12 DIAGNOSIS — J06.9 ACUTE URI: Primary | ICD-10-CM

## 2025-03-12 DIAGNOSIS — Z20.822 EXPOSURE TO COVID-19 VIRUS: ICD-10-CM

## 2025-03-12 DIAGNOSIS — R05.1 ACUTE COUGH: ICD-10-CM

## 2025-03-12 LAB
EXPIRATION DATE: NORMAL
FLUAV AG UPPER RESP QL IA.RAPID: NOT DETECTED
FLUBV AG UPPER RESP QL IA.RAPID: NOT DETECTED
INTERNAL CONTROL: NORMAL
Lab: NORMAL
SARS-COV-2 AG UPPER RESP QL IA.RAPID: NOT DETECTED

## 2025-03-12 PROCEDURE — 87428 SARSCOV & INF VIR A&B AG IA: CPT

## 2025-03-12 PROCEDURE — 99213 OFFICE O/P EST LOW 20 MIN: CPT

## 2025-03-12 RX ORDER — ERGOCALCIFEROL (VITAMIN D2) 10 MCG
400 TABLET ORAL DAILY
COMMUNITY

## 2025-03-12 RX ORDER — FOLIC ACID 0.4 MG
400 TABLET ORAL DAILY
COMMUNITY

## 2025-03-12 NOTE — PROGRESS NOTES
Subjective     CHIEF COMPLAINT    Chief Complaint   Patient presents with    Fatigue     X 5 days - exposure to covid    Nasal Congestion     X 5 days     Patient or patient representative verbalized consent for the use of Ambient Listening during the visit with  JOSETTE Mcgee for chart documentation. 3/12/2025  08:31 EDT    History of Present Illness  The patient is a 49-year-old female who presents for evaluation of potential COVID-19 infection.    She reports exposure to COVID-19 last week through her supervisor, who was diagnosed with the virus. She is uncertain whether her current symptoms are indicative of a COVID-19 infection or a sinus infection. Her symptoms began on Friday, characterized by difficulty breathing, nasal congestion, and a runny nose. She also experiences frequent sneezing and severe fatigue, feeling as though she has not slept for an extended period despite adequate sleep. She reports chills but no fever. Initially, she had a dry cough, which she attributed to allergies, and a burning sensation across her sinuses, leading her to suspect a sinus infection. She has been managing her symptoms with daily allergy medication, a regimen she has maintained for several years. She alternates between Zyrtec and Claritin every six months. She notes that her symptoms tend to worsen in the spring season.    MEDICATIONS  Current: Zyrtec, Claritin          Review of Systems   Constitutional:  Positive for chills and fatigue. Negative for fever.   HENT:  Positive for congestion, rhinorrhea and sneezing.    Respiratory:  Positive for cough.        Social History     Socioeconomic History    Marital status:    Tobacco Use    Smoking status: Never    Smokeless tobacco: Never   Vaping Use    Vaping status: Never Used   Substance and Sexual Activity    Alcohol use: Yes     Comment: occ    Drug use: Never    Sexual activity: Yes     Partners: Male         Allergies   Allergen Reactions    Penicillins  "Anaphylaxis    Sulfa Antibiotics Hives          Current Outpatient Medications on File Prior to Visit   Medication Sig Dispense Refill    cetirizine (zyrTEC) 10 MG tablet Take 1 tablet by mouth Daily.      folic acid (FOLVITE) 400 MCG tablet Take 1 tablet by mouth Daily.      multivitamin with minerals tablet tablet Take 1 tablet by mouth Daily.      Vitamin D, Cholecalciferol, (CHOLECALCIFEROL) 10 MCG (400 UNIT) tablet Take 1 tablet by mouth Daily.      vitamin E 100 UNIT capsule Take 1 capsule by mouth Daily.       No current facility-administered medications on file prior to visit.       /77   Pulse 86   Temp 98.3 °F (36.8 °C) (Oral)   Ht 165.1 cm (65\")   Wt 56.6 kg (124 lb 12.8 oz)   SpO2 98%   BMI 20.77 kg/m²       Objective     Physical Exam  Vitals and nursing note reviewed.   Constitutional:       General: She is not in acute distress.     Appearance: Normal appearance. She is not ill-appearing.   HENT:      Head: Normocephalic.      Right Ear: Tympanic membrane, ear canal and external ear normal.      Left Ear: Tympanic membrane, ear canal and external ear normal.      Nose: Nose normal.      Mouth/Throat:      Lips: Pink.      Mouth: Mucous membranes are moist.      Pharynx: Oropharynx is clear. Uvula midline. No pharyngeal swelling, oropharyngeal exudate, posterior oropharyngeal erythema or uvula swelling.   Eyes:      Pupils: Pupils are equal, round, and reactive to light.   Cardiovascular:      Rate and Rhythm: Normal rate and regular rhythm.      Heart sounds: Normal heart sounds. No murmur heard.  Pulmonary:      Effort: Pulmonary effort is normal. No accessory muscle usage or respiratory distress.      Breath sounds: Normal breath sounds. No wheezing or rhonchi.   Musculoskeletal:      Cervical back: Normal range of motion.   Lymphadenopathy:      Cervical: No cervical adenopathy.   Skin:     General: Skin is warm and dry.   Neurological:      General: No focal deficit present.      " Mental Status: She is alert and oriented to person, place, and time.   Psychiatric:         Mood and Affect: Mood and affect normal.         Behavior: Behavior normal.            Lab Results (last 24 hours)       Procedure Component Value Units Date/Time    POCT SARS-CoV-2 Antigen DENIS + Flu [[id number]] Collected: 03/12/25 0825    Specimen: Swab Updated: 03/12/25 0827     SARS Antigen Not Detected     Influenza A Antigen DENIS Not Detected     Influenza B Antigen DENIS Not Detected     Internal Control Passed     Lot Number [id number]     Expiration Date 7/23/25            Assessment & Plan  Acute URI  Patient is negative for COVID and flu on rapid testing today.  Recommended COVID PCR due to exposure to COVID but she declines.  No evidence of bacterial infection on exam today.  Discussed expected duration of viral illnesses.  Recommend symptomatic treatment which was discussed with patient.  Increase fluid intake, rest.  Recommend Flonase over-the-counter.  Return to clinic if symptoms worsen or fail to improve.       Acute cough    Orders:    POCT SARS-CoV-2 Antigen DENIS + Flu    Exposure to COVID-19 virus             Follow up:  Return if symptoms worsen or fail to improve.  Patient was given instructions and counseling regarding her condition or for health maintenance advice. Please see specific information pulled into the AVS if appropriate.

## 2025-03-12 NOTE — LETTER
March 12, 2025     Patient: Ham Gonzalez   YOB: 1975   Date of Visit: 3/12/2025       To Whom It May Concern:    It is my medical opinion that Ham Gonzalez may return to work on 3/13/25.         Sincerely,    JOSETTE Mcgee

## 2025-05-14 ENCOUNTER — OFFICE VISIT (OUTPATIENT)
Dept: FAMILY MEDICINE CLINIC | Age: 50
End: 2025-05-14
Payer: COMMERCIAL

## 2025-05-14 VITALS
SYSTOLIC BLOOD PRESSURE: 125 MMHG | DIASTOLIC BLOOD PRESSURE: 84 MMHG | HEIGHT: 65 IN | WEIGHT: 126.9 LBS | BODY MASS INDEX: 21.14 KG/M2 | OXYGEN SATURATION: 96 % | HEART RATE: 86 BPM | TEMPERATURE: 98.3 F

## 2025-05-14 DIAGNOSIS — Z12.31 SCREENING MAMMOGRAM FOR BREAST CANCER: ICD-10-CM

## 2025-05-14 DIAGNOSIS — Z11.59 SCREENING FOR VIRAL DISEASE: ICD-10-CM

## 2025-05-14 DIAGNOSIS — Z00.00 ANNUAL PHYSICAL EXAM: Primary | ICD-10-CM

## 2025-05-14 NOTE — PROGRESS NOTES
Chief Complaint  Ham Gonzalez presents to [facility] for Annual Exam    Subjective          History of Present Illness    Ham is here today for preventive exam. I last saw her on 4/30/21.   Declines covid, zoster, PNA, Tdap vaccines.  Last mammogram on file 10/16/2020. She reports that she thinks that she had one in 2022 with Dr Newsome.   Pap smear no longer indicated by history. S/p hysterectomy.  She has not had colorectal CA screening. Discussed colonoscopy, ColoGuard. Declines at this time.   Never smoker.     She has been seeing worker's comp provider at [facility] for left shoulder injury. They have her wearing sling for 8 hours a day. She is going to be starting physical therapy next week.   She notes menopause symptoms including hot flashes, mood changes. She has been using OTC product which seems to be helping. Declines to start SSRI such as fluoxetine for symptoms at this time.     Review of Systems   Constitutional:  Negative for chills and fever.   HENT:  Negative for ear pain and sore throat.    Eyes:  Negative for blurred vision and redness.   Respiratory:  Negative for shortness of breath and wheezing.    Cardiovascular:  Negative for chest pain and palpitations.   Gastrointestinal:  Negative for abdominal pain and vomiting.   Genitourinary:  Negative for frequency and urgency.   Musculoskeletal:         Left arm pain- following with workers comp   Skin:  Negative for rash.   Neurological:  Negative for seizures and syncope.   Psychiatric/Behavioral:  Negative for suicidal ideas and depressed mood.          Allergies   Allergen Reactions    Penicillins Anaphylaxis    Morphine And Codeine Hallucinations    Sulfa Antibiotics Hives      Past Medical History:   Diagnosis Date    Anxiety     Depression     Headache      Current Outpatient Medications   Medication Sig Dispense Refill    cetirizine (zyrTEC) 10 MG tablet Take 1 tablet by mouth Daily.      folic  "acid (FOLVITE) 400 MCG tablet Take 1 tablet by mouth Daily.      multivitamin with minerals tablet tablet Take 1 tablet by mouth Daily.      Vitamin D, Cholecalciferol, (CHOLECALCIFEROL) 10 MCG (400 UNIT) tablet Take 1 tablet by mouth Daily.      vitamin E 100 UNIT capsule Take 1 capsule by mouth Daily.       No current facility-administered medications for this visit.     Past Surgical History:   Procedure Laterality Date    HYSTERECTOMY  2022      Social History     Tobacco Use    Smoking status: Never    Smokeless tobacco: Never   Vaping Use    Vaping status: Never Used   Substance Use Topics    Alcohol use: Yes     Comment: occ    Drug use: Never     Family History   Problem Relation Age of Onset    Hypertension Mother     Hyperlipidemia Mother     Heart disease Mother      Health Maintenance Due   Topic Date Due    COLORECTAL CANCER SCREENING  Never done    MAMMOGRAM  10/16/2022    HEPATITIS C SCREENING  Never done    ANNUAL PHYSICAL  Never done      Immunization History   Administered Date(s) Administered    Influenza, Unspecified 10/01/2017        Objective     Vitals:    05/14/25 1420   BP: 125/84   Pulse: 86   Temp: 98.3 °F (36.8 °C)   TempSrc: Oral   SpO2: 96%   Weight: 57.6 kg (126 lb 14.4 oz)   Height: 165.1 cm (65\")     Body mass index is 21.12 kg/m².   BMI is within normal parameters. No other follow-up for BMI required.            No results found.    Physical Exam  Vitals reviewed.   Constitutional:       General: She is not in acute distress.     Appearance: Normal appearance. She is well-developed.   HENT:      Head: Normocephalic and atraumatic.      Right Ear: Hearing, tympanic membrane and ear canal normal.      Left Ear: Hearing, tympanic membrane and ear canal normal.      Mouth/Throat:      Mouth: Mucous membranes are moist.   Eyes:      Extraocular Movements: Extraocular movements intact.      Pupils: Pupils are equal, round, and reactive to light.   Cardiovascular:      Rate and Rhythm: " Normal rate and regular rhythm.      Pulses: Normal pulses.      Heart sounds: Normal heart sounds.   Pulmonary:      Effort: Pulmonary effort is normal.      Breath sounds: Normal breath sounds.   Abdominal:      General: Bowel sounds are normal.      Palpations: Abdomen is soft.      Tenderness: There is no abdominal tenderness.   Musculoskeletal:      Cervical back: Normal range of motion and neck supple.      Comments: Wearing sling on left arm   Skin:     General: Skin is warm and dry.   Neurological:      Mental Status: She is alert and oriented to person, place, and time.   Psychiatric:         Mood and Affect: Mood normal.           Result Review :                               Assessment and Plan      Assessment & Plan  Annual physical exam  Appropriate screenings and vaccinations were reviewed with the pt and offered as indicated.  Pt counseled on healthy lifestyle including healthy diet, exercise.  She will let me know if she wishes to proceed with colonoscopy or Cologuard.     Orders:    CBC (No Diff); Future    Lipid Panel; Future    Comprehensive Metabolic Panel; Future    Screening mammogram for breast cancer  Screening mammogram ordered  Orders:    Mammo Screening Digital Tomosynthesis Bilateral With CAD; Future    Screening for viral disease  Hep C lab ordered  Orders:    Hepatitis C antibody; Future              Follow Up     Return in about 1 year (around 5/14/2026) for Annual physical.

## 2025-06-03 ENCOUNTER — TELEPHONE (OUTPATIENT)
Dept: FAMILY MEDICINE CLINIC | Age: 50
End: 2025-06-03
Payer: COMMERCIAL

## 2025-06-03 NOTE — TELEPHONE ENCOUNTER
[facility] regarding overdue Mammo ordered 5/14/25. [phone] is Diagnostic Imaging phone number to call and schedule at patient's convenience. 1st attempt   
full range of motion in all extremities

## 2025-06-19 ENCOUNTER — TELEPHONE (OUTPATIENT)
Dept: FAMILY MEDICINE CLINIC | Age: 50
End: 2025-06-19
Payer: COMMERCIAL

## 2025-06-19 NOTE — TELEPHONE ENCOUNTER
*HUB TO RELAY*  Pt called to remind them of the mammo order in their chart. A VM could not be left. Please provide pt with this phone number so they can schedule at their convenience. [phone] option 3

## 2025-07-14 ENCOUNTER — HOSPITAL ENCOUNTER (OUTPATIENT)
Dept: MAMMOGRAPHY | Facility: HOSPITAL | Age: 50
Discharge: HOME | End: 2025-07-14
Payer: COMMERCIAL

## 2025-07-14 ENCOUNTER — LAB (OUTPATIENT)
Dept: LAB | Facility: HOSPITAL | Age: 50
End: 2025-07-14
Payer: COMMERCIAL

## 2025-07-14 DIAGNOSIS — Z00.00 ANNUAL PHYSICAL EXAM: ICD-10-CM

## 2025-07-14 DIAGNOSIS — Z12.31 SCREENING MAMMOGRAM FOR BREAST CANCER: ICD-10-CM

## 2025-07-14 DIAGNOSIS — Z11.59 SCREENING FOR VIRAL DISEASE: ICD-10-CM

## 2025-07-14 LAB
ALBUMIN SERPL-MCNC: 4.2 G/DL (ref 3.5–5.2)
ALBUMIN/GLOB SERPL: 1.4 G/DL
ALP SERPL-CCNC: 101 U/L (ref 39–117)
ALT SERPL W P-5'-P-CCNC: 15 U/L (ref 1–33)
ANION GAP SERPL CALCULATED.3IONS-SCNC: 10 MMOL/L (ref 5–15)
AST SERPL-CCNC: 29 U/L (ref 1–32)
BILIRUB SERPL-MCNC: 0.7 MG/DL (ref 0–1.2)
BUN SERPL-MCNC: 10 MG/DL (ref 6–20)
BUN/CREAT SERPL: 13.7 (ref 7–25)
CALCIUM SPEC-SCNC: 9.7 MG/DL (ref 8.6–10.5)
CHLORIDE SERPL-SCNC: 107 MMOL/L (ref 98–107)
CHOLEST SERPL-MCNC: 204 MG/DL (ref 0–200)
CO2 SERPL-SCNC: 25 MMOL/L (ref 22–29)
CREAT SERPL-MCNC: 0.73 MG/DL (ref 0.57–1)
DEPRECATED RDW RBC AUTO: 44.5 FL (ref 37–54)
EGFRCR SERPLBLD CKD-EPI 2021: 100.3 ML/MIN/1.73
ERYTHROCYTE [DISTWIDTH] IN BLOOD BY AUTOMATED COUNT: 13.6 % (ref 12.3–15.4)
GLOBULIN UR ELPH-MCNC: 3.1 GM/DL
GLUCOSE SERPL-MCNC: 95 MG/DL (ref 65–99)
HCT VFR BLD AUTO: 40.5 % (ref 34–46.6)
HCV AB SER QL: NORMAL
HDLC SERPL-MCNC: 53 MG/DL (ref 40–60)
HGB BLD-MCNC: 13.4 G/DL (ref 12–15.9)
LDLC SERPL CALC-MCNC: 135 MG/DL (ref 0–100)
LDLC/HDLC SERPL: 2.52 {RATIO}
MCH RBC QN AUTO: 29.1 PG (ref 26.6–33)
MCHC RBC AUTO-ENTMCNC: 33.1 G/DL (ref 31.5–35.7)
MCV RBC AUTO: 88 FL (ref 79–97)
PLATELET # BLD AUTO: 169 10*3/MM3 (ref 140–450)
PMV BLD AUTO: 10.6 FL (ref 6–12)
POTASSIUM SERPL-SCNC: 3.9 MMOL/L (ref 3.5–5.2)
PROT SERPL-MCNC: 7.3 G/DL (ref 6–8.5)
RBC # BLD AUTO: 4.6 10*6/MM3 (ref 3.77–5.28)
SODIUM SERPL-SCNC: 142 MMOL/L (ref 136–145)
TRIGL SERPL-MCNC: 88 MG/DL (ref 0–150)
VLDLC SERPL-MCNC: 16 MG/DL (ref 5–40)
WBC NRBC COR # BLD AUTO: 4.04 10*3/MM3 (ref 3.4–10.8)

## 2025-07-14 PROCEDURE — 86803 HEPATITIS C AB TEST: CPT

## 2025-07-14 PROCEDURE — 80053 COMPREHEN METABOLIC PANEL: CPT

## 2025-07-14 PROCEDURE — 77067 SCR MAMMO BI INCL CAD: CPT

## 2025-07-14 PROCEDURE — 77063 BREAST TOMOSYNTHESIS BI: CPT

## 2025-07-14 PROCEDURE — 85027 COMPLETE CBC AUTOMATED: CPT

## 2025-07-14 PROCEDURE — 80061 LIPID PANEL: CPT

## 2025-07-14 PROCEDURE — 36415 COLL VENOUS BLD VENIPUNCTURE: CPT

## 2025-07-15 ENCOUNTER — RESULTS FOLLOW-UP (OUTPATIENT)
Dept: FAMILY MEDICINE CLINIC | Age: 50
End: 2025-07-15
Payer: COMMERCIAL

## 2025-07-15 NOTE — LETTER
Ham Gonzalez  [address] [place] [id number]    July 15, 2025     Ham,  I am pleased to report that your mammogram is normal.   As you know, early detection of cancer is very important.  Although mammography is the most accurate method of early detection, not all cancers are found through mammography.  A thorough exam includes a combination of periodic mammograms, yearly physical examination by your doctor or health care provider and monthly breast self-examinations.  Remember, you should never ignore a breast lump or any other change in your breasts, even if your mammogram is normal. If you find a lump or other change, talk to your healthcare provider about it as soon as possible.  JOSETTE Cobb     Resulted Orders   Mammo Screening Digital Tomosynthesis Bilateral With CAD    Addendum: 7/14/2025    ADDENDUM:  BI-RADS assessment correction.     BI-RADS CATEGORY 1: Negative.     7/14/2025 8:46 AM by Milan Nolasco MD on Workstation: [id number]         Narrative    MAMMO SCREENING DIGITAL TOMOSYNTHESIS BILATERAL W CAD-     Date of Exam: 7/14/2025 8:22 AM     Indication: Screening.     Comparison: Multiple prior exams including 10/16/2020 screening  mammogram     Technique: Routine screening mammogram images were obtained per  protocol.  Tomosynthesis was utilized.  These mammographic images were  interpreted with the assistance of a computer aided detection system.     Breast Density: The breasts are heterogeneously dense, which may obscure  small masses.     Findings:   No suspicious masses, suspicious microcalcifications, or architectural  distortions are identified.       Impression    No mammographic evidence of malignancy.  Recommend annual screening  mammography.     BI-RADS ASSESSMENT: Category 0: Incomplete-Need Additional Imaging  Evaluation      Note: It has been reported that there is approximately a 15% false  negative rate in mammography.  Therefore, management of a  palpable  abnormality should not be deferred because of a negative mammogram.     7/14/2025 8:39 AM by Milan Nolasco MD on Workstation: [id number]